# Patient Record
Sex: MALE | Race: WHITE
[De-identification: names, ages, dates, MRNs, and addresses within clinical notes are randomized per-mention and may not be internally consistent; named-entity substitution may affect disease eponyms.]

---

## 2018-02-19 ENCOUNTER — HOSPITAL ENCOUNTER (INPATIENT)
Dept: HOSPITAL 74 - JER | Age: 53
LOS: 4 days | Discharge: ANOTHER HOSPITAL | DRG: 292 | End: 2018-02-23
Attending: INTERNAL MEDICINE | Admitting: INTERNAL MEDICINE
Payer: MEDICARE

## 2018-02-19 VITALS — BODY MASS INDEX: 38.4 KG/M2

## 2018-02-19 DIAGNOSIS — I24.8: ICD-10-CM

## 2018-02-19 DIAGNOSIS — I11.0: Primary | ICD-10-CM

## 2018-02-19 DIAGNOSIS — I35.0: ICD-10-CM

## 2018-02-19 DIAGNOSIS — D69.6: ICD-10-CM

## 2018-02-19 DIAGNOSIS — E78.5: ICD-10-CM

## 2018-02-19 DIAGNOSIS — L03.115: ICD-10-CM

## 2018-02-19 DIAGNOSIS — D64.9: ICD-10-CM

## 2018-02-19 DIAGNOSIS — I50.21: ICD-10-CM

## 2018-02-19 LAB
ALBUMIN SERPL-MCNC: 3.1 G/DL (ref 3.4–5)
ALP SERPL-CCNC: 95 U/L (ref 45–117)
ALT SERPL-CCNC: 10 U/L (ref 12–78)
ANION GAP SERPL CALC-SCNC: 6 MMOL/L (ref 8–16)
AST SERPL-CCNC: 39 U/L (ref 15–37)
BASOPHILS # BLD: 1.1 % (ref 0–2)
BILIRUB SERPL-MCNC: 1.1 MG/DL (ref 0.2–1)
BUN SERPL-MCNC: 9 MG/DL (ref 7–18)
CALCIUM SERPL-MCNC: 8.6 MG/DL (ref 8.5–10.1)
CHLORIDE SERPL-SCNC: 99 MMOL/L (ref 98–107)
CO2 SERPL-SCNC: 29 MMOL/L (ref 21–32)
CREAT SERPL-MCNC: 0.8 MG/DL (ref 0.7–1.3)
DEPRECATED RDW RBC AUTO: 24.4 % (ref 11.9–15.9)
EOSINOPHIL # BLD: 1.1 % (ref 0–4.5)
GLUCOSE SERPL-MCNC: 90 MG/DL (ref 74–106)
HCT VFR BLD CALC: 28.2 % (ref 35.4–49)
HGB BLD-MCNC: 8.7 GM/DL (ref 11.7–16.9)
LYMPHOCYTES # BLD: 26.7 % (ref 8–40)
MCH RBC QN AUTO: 21.9 PG (ref 25.7–33.7)
MCHC RBC AUTO-ENTMCNC: 30.7 G/DL (ref 32–35.9)
MCV RBC: 71.4 FL (ref 80–96)
MONOCYTES # BLD AUTO: 15.3 % (ref 3.8–10.2)
NEUTROPHILS # BLD: 55.8 % (ref 42.8–82.8)
PLATELET # BLD AUTO: 101 K/MM3 (ref 134–434)
PMV BLD: 8.7 FL (ref 7.5–11.1)
POTASSIUM SERPLBLD-SCNC: 4.1 MMOL/L (ref 3.5–5.1)
PROT SERPL-MCNC: 9.6 G/DL (ref 6.4–8.2)
RBC # BLD AUTO: 3.95 M/MM3 (ref 4–5.6)
SODIUM SERPL-SCNC: 134 MMOL/L (ref 136–145)
WBC # BLD AUTO: 4.4 K/MM3 (ref 4–10)

## 2018-02-19 RX ADMIN — HEPARIN SODIUM SCH UNIT: 5000 INJECTION, SOLUTION INTRAVENOUS; SUBCUTANEOUS at 21:55

## 2018-02-19 NOTE — HP
Admitting History and Physical





- Primary Care Physician


PCP: Catalina Nino





- Admission


History of Present Illness: 


 52 year old male with a history of HTN, HLD, who presents for evaluation of 

SOB and lower extremity swelling.  The patient reports that he does not 

normally follow with a physician and does not know his medical history very 

well.  He reports a 2 week history of worsening lower extremity swelling.  He 

also states a 1 week history of worsening SOB and dyspnea on exertion with 

orthopnea prompting his presentation to the ED for evaluation.  The patient 

states that he has been having difficulty sleeping due to SOB at night as well 

and now can only walk 5 steps without becoming SOB. 


 











- Past Medical History


Cardiovascular: Yes: HTN, Hyperlipdemia





- Smoking History


Smoking history: Never smoked


Have you smoked in the past 12 months: No





- Alcohol/Substance Use


Hx Alcohol Use: No





Home Medications





- Allergies


Allergies/Adverse Reactions: 


 Allergies











Allergy/AdvReac Type Severity Reaction Status Date / Time


 


No Known Allergies Allergy   Verified 02/19/18 13:22














- Home Medications


Home Medications: 


Ambulatory Orders





Metoprolol Succinate 25 mg PO DAILY 02/19/18 











Physical Examination


Vital Signs: 


 Vital Signs











Temperature  98.6 F   02/19/18 13:23


 


Pulse Rate  107 H  02/19/18 16:17


 


Respiratory Rate  24   02/19/18 16:17


 


Blood Pressure  129/90   02/19/18 16:17


 


O2 Sat by Pulse Oximetry (%)  96   02/19/18 16:17











Constitutional: Yes: No Distress


HENT: Yes: Atraumatic


Neck: Yes: Supple


Cardiovascular: Yes: Regular Rate and Rhythm


Respiratory: Yes: CTA Bilaterally


Gastrointestinal: Yes: Normal Bowel Sounds


Edema: Yes


Edema: LLE: 2+, RLE: 2+


Neurological: Yes: Alert, Oriented


Labs: 


 CBC, BMP





 02/19/18 14:25 





 02/19/18 14:25 











Problem List





- Problems


(1) CHF (congestive heart failure)


Assessment/Plan: 


on iv lasix


monitor 


cardiology consult


Code(s): I50.9 - HEART FAILURE, UNSPECIFIED   


Qualifiers: 


   Heart failure type: systolic   Heart failure chronicity: acute   Qualified 

Code(s): I50.21 - Acute systolic (congestive) heart failure   





(2) Subendocardial ischemia


Assessment/Plan: 


fu troponins


Code(s): I24.8 - OTHER FORMS OF ACUTE ISCHEMIC HEART DISEASE   





(3) Thrombocytopenia


Assessment/Plan: 


monitor


Code(s): D69.6 - THROMBOCYTOPENIA, UNSPECIFIED   





(4) Cellulitis of right leg


Assessment/Plan: 


iv abx


id consult


Code(s): L03.115 - CELLULITIS OF RIGHT LOWER LIMB   





Assessment/Plan





 Laboratory Tests











  02/19/18 02/19/18





  14:25 14:25


 


WBC  4.4 


 


RBC  3.95 L 


 


Hgb  8.7 L 


 


Hct  28.2 L 


 


MCV  71.4 L 


 


MCH  21.9 L 


 


MCHC  30.7 L 


 


RDW  24.4 H 


 


Plt Count  101 L 


 


MPV  8.7 


 


Neutrophils %  55.8 


 


Lymphocytes %  26.7 


 


Monocytes %  15.3 H 


 


Eosinophils %  1.1 


 


Basophils %  1.1 


 


Sodium   134 L


 


Potassium   4.1


 


Chloride   99


 


Carbon Dioxide   29


 


Anion Gap   6 L


 


BUN   9


 


Creatinine   0.8


 


Creat Clearance w eGFR   > 60


 


Random Glucose   90


 


Calcium   8.6


 


Total Bilirubin   1.1 H


 


AST   39 H


 


ALT   10 L


 


Alkaline Phosphatase   95


 


Creatine Kinase   250


 


Creatine Kinase Index   2.9


 


CK-MB (CK-2)   7.376 H


 


Troponin I   0.15 H


 


B-Natriuretic Peptide   2294.20 H


 


Total Protein   9.6 H


 


Albumin   3.1 L








Active Medications











Generic Name Dose Route Start Last Admin





  Trade Name Freq  PRN Reason Stop Dose Admin


 


Furosemide  40 mg  02/20/18 14:00  02/21/18 14:18





  Lasix Injection -  IVPUSH   40 mg





  BIDLASIX DEREK   Administration


 


Heparin Sodium (Porcine)  5,000 unit  02/19/18 22:00  02/21/18 09:01





  Heparin -  SQ   Not Given





  BID DEREK   


 


Ampicillin Sodium/Sulbactam  100 mls @ 200 mls/hr  02/20/18 18:00  02/21/18 09:

01





  Sodium 3 gm/ Sodium Chloride  IVPB   200 mls/hr





  Q8H-IV DEREK   Administration


 


Losartan Potassium  25 mg  02/20/18 12:30  02/21/18 09:01





  Cozaar -  PO   25 mg





  DAILY DEREK   Administration


 


Metoprolol Succinate  25 mg  02/20/18 10:00  02/21/18 09:01





  Toprol Xl -  PO   25 mg





  DAILY DEREK   Administration


 


Spironolactone  25 mg  02/21/18 10:30  02/21/18 11:23





  Aldactone -  PO   25 mg





  DAILY DEREK   Administration

## 2018-02-19 NOTE — PDOC
Attending Attestation





- HPI


HPI: 





02/19/18 14:59


The patient is a 52 year old male with a significant PMH of HTN, HLD, and CHF 

who presents to the emergency department with lower extremity swelling for the 

past 2 weeks and SOB, dyspnea on exertion, and orthopnea for the past week. The 

patient states he does not follow up with a PCP. The patient is not currently 

on any water pills. 





The patient denies chest pain, headache and dizziness.


Denies fever, chills, nausea, vomit, diarrhea and constipation.


Denies dysuria, frequency, urgency and hematuria.





Allergies: NKA


Past surgical history: None reported


Social history: No reported alcohol, drug or cigarette use. 











<Rosie Vazquez - Last Filed: 02/19/18 14:59>





- Resident


Resident Name: Chris Pineda





- ED Attending Attestation


I have performed the following: I have examined & evaluated the patient, The 

case was reviewed & discussed with the resident, I agree w/resident's findings 

& plan, Exceptions are as noted





- Physicial Exam


PE: 





GENERAL: Awake, alert, and fully oriented, in no acute distress


HEAD: No signs of trauma


EYES: PERRLA, EOMI, sclera anicteric, conjunctiva clear


ENT: Auricles normal inspection, hearing grossly normal, nares patent, 

oropharynx clear without exudates. Moist mucosa


NECK: Normal ROM, supple, no lymphadenopathy, JVD, or masses


LUNGS: +Crackles at bases B/L. Good air entry B/L. 


HEART: Regular rate and rhythm, normal S1 and S2, no murmurs, rubs or gallops


ABDOMEN: Soft, nontender, normoactive bowel sounds.  No guarding, no rebound.  

No masses


EXTREMITIES: Normal range of motion, 3+ edema to the level of lower abdomen.  

No clubbing or cyanosis. No cords, erythema, or tenderness


NEUROLOGICAL: Cranial nerves II through XII grossly intact.  Normal speech, 

normal gait


SKIN: Warm, Dry, normal turgor, no rashes or lesions noted.











- Medical Decision Making





Pt with clear signs of CHF, likely due to med nonadherence. Will send labs, 

obtain CXR and plan for admission.








<Marilu Ruggiero - Last Filed: 02/20/18 11:17>

## 2018-02-19 NOTE — PDOC
History of Present Illness





- General


Chief Complaint: Shortness of Breath


Stated Complaint: SOB, SWOLLEN LEGS


Time Seen by Provider: 02/19/18 13:42





- History of Present Illness


Initial Comments: 





02/19/18 15:22


The patient is a 52 year old male with a history of HTN, HLD, who presents for 

evaluation of SOB and lower extremity swelling.  The patient reports that he 

does not normally follow with a physician and does not know his medical history 

very well.  He reports a 2 week history of worsening lower extremity swelling.  

He also states a 1 week history of worsening SOB and dyspnea on exertion with 

orthopnea prompting his presentation to the ED for evaluation.  The patient 

states that he has been having difficulty sleeping due to SOB at night as well 

and now can only walk 5 steps without becoming SOB.  He denies fevers, chills, 

chest pain, nausea, vomiting, abdominal pain, or changes with urination or 

bowel movements.





Past History





- Past Medical History


Allergies/Adverse Reactions: 


 Allergies











Allergy/AdvReac Type Severity Reaction Status Date / Time


 


No Known Allergies Allergy   Verified 02/19/18 13:22











Home Medications: 


Ambulatory Orders





Metoprolol Succinate 25 mg PO DAILY 02/19/18 











- Suicide/Smoking/Psychosocial Hx


Smoking History: Never smoked


Have you smoked in the past 12 months: No


Information on smoking cessation initiated: No


Hx Alcohol Use: No


Drug/Substance Use Hx: No





**Review of Systems





- Review of Systems


Comments:: 





02/19/18 15:32


Constitutional: No fevers, chills, fatigue, malaise


HEENT: No Rhinorrhea, nasal congestion, visual changes


Cardiovascular: No chest pain, syncope, palpitations, lightheadedness


Respiratory: SOB.  No Cough, Hemoptysis,


Gastrointestinal: No Abdominal pain, Nausea, Vomiting, Constipation, Diarrhea, 

Melena


Genitourinary: No Dysuria, Frequency, Urgency, Hesitancy, Hematuria, Flank pain


Musculoskeletal: No Myalgia, arthralgia


Skin: Lower extremity swelling.  No rashes, itching, bruising, pallor


Neurologic: No Headache, Dizziness, Numbness, Weakness, or Tingling


Psychiatric: No Hallucinations. No SI or HI








*Physical Exam





- Vital Signs


 Last Vital Signs











Temp Pulse Resp BP Pulse Ox


 


 98.6 F   105 H  26 H  141/104   98 


 


 02/19/18 13:23  02/19/18 13:23  02/19/18 13:23  02/19/18 13:23  02/19/18 13:23














- Physical Exam


Comments: 





02/19/18 15:33


General Appearance: Nourished. No Apparent Distress


HEENT: EOMI, RIGO. No Pharyngeal Erythema, Tonsillar Exudate, Tonsillar Erythema


Neck: No Cervical Lymphadenopathy


Respiratory/Chest: Diffuse crackles and rales auscultated on exam


Cardiovascular: Regular Rhythm, Regular Rate. Systolic murmur noted on exam.  

No Gallops, Rubs


Gastrointestinal/Abdominal: 3+ pitting abdominal edema noted.  Normal Bowel 

Sounds, Soft. No Guarding, Rebound, Tenderness


Musculoskeletal: No CVA Tenderness


Extremity: 3+ pitting edema in the lower extremities.  Normal Capillary Refill


Integumentary: Normal Color, Dry, Warm


Neurologic: Fully Oriented, Alert, Normal Mood/Affect, Normal Response,





**Heart Score/ECG Review


  ** #1


ECG reviewed & interpreted by me at: 15:36 (Sinus Tachycardic)


General ECG Interpretation: Sinus Rhythm, Normal Rate, Normal Intervals, No 

acute ischemic changes





ED Treatment Course





- LABORATORY


CBC & Chemistry Diagram: 


 02/19/18 14:25





 02/19/18 14:25





Medical Decision Making





- Medical Decision Making





02/19/18 15:36


The patient is a 52 year old male with a history of HTN, HLD, who presents for 

evaluation of SOB and lower extremity swelling.  Differential includes but is 

not limited to: CHF exacerbation, COPD, ACS, pneumonia, infectious, metabolic 

derangement.  Given the patient's physical exam, it is likely the patient 

symptoms are due to a CHF exacerbation.  We will obtain a cbc, cmp, troponin, 

bnp, ekg, chest plain film to evaluate further.  We will treat the patient with 

lasix here in the ED.  We will continue to monitor and reassess.





02/19/18 18:35


CBC is unremarkable.  cmp is unremarkable,  troponin is elevated to .15 and bnp 

is elevated to 2200.  Chest plain film demonstrates signs of congestive 

findings concerning for chf as read by our radiologist.  We believe the patient 

requires admission for further management of his symptoms.  We discussed the 

case with Dr. Nino who accepted the patient for admission.





*DC/Admit/Observation/Transfer


Diagnosis at time of Disposition: 


CHF (congestive heart failure)


Qualifiers:


 Heart failure type: unspecified Heart failure chronicity: acute Qualified Code(

s): I50.9 - Heart failure, unspecified








- Discharge Dispostion


Condition at time of disposition: Guarded


Admit: Yes





- Referrals





- Patient Instructions





- Post Discharge Activity

## 2018-02-20 LAB
ALBUMIN SERPL-MCNC: 2.8 G/DL (ref 3.4–5)
ALP SERPL-CCNC: 85 U/L (ref 45–117)
ALT SERPL-CCNC: 9 U/L (ref 12–78)
ANION GAP SERPL CALC-SCNC: 6 MMOL/L (ref 8–16)
ANISOCYTOSIS BLD QL: (no result)
AST SERPL-CCNC: 43 U/L (ref 15–37)
BASOPHILS # BLD: 0.9 % (ref 0–2)
BILIRUB SERPL-MCNC: 1.4 MG/DL (ref 0.2–1)
BUN SERPL-MCNC: 13 MG/DL (ref 7–18)
CALCIUM SERPL-MCNC: 9 MG/DL (ref 8.5–10.1)
CHLORIDE SERPL-SCNC: 96 MMOL/L (ref 98–107)
CO2 SERPL-SCNC: 31 MMOL/L (ref 21–32)
CREAT SERPL-MCNC: 0.9 MG/DL (ref 0.7–1.3)
DEPRECATED RDW RBC AUTO: 24.2 % (ref 11.9–15.9)
EOSINOPHIL # BLD: 1 % (ref 0–4.5)
GLUCOSE SERPL-MCNC: 107 MG/DL (ref 74–106)
HCT VFR BLD CALC: 25.5 % (ref 35.4–49)
HGB BLD-MCNC: 8.1 GM/DL (ref 11.7–16.9)
LYMPHOCYTES # BLD: 24.3 % (ref 8–40)
MCH RBC QN AUTO: 22.3 PG (ref 25.7–33.7)
MCHC RBC AUTO-ENTMCNC: 31.8 G/DL (ref 32–35.9)
MCV RBC: 70 FL (ref 80–96)
MONOCYTES # BLD AUTO: 19.8 % (ref 3.8–10.2)
NEUTROPHILS # BLD: 54 % (ref 42.8–82.8)
PLATELET # BLD AUTO: 98 K/MM3 (ref 134–434)
PMV BLD: 8.7 FL (ref 7.5–11.1)
POTASSIUM SERPLBLD-SCNC: 3.7 MMOL/L (ref 3.5–5.1)
PROT SERPL-MCNC: 9 G/DL (ref 6.4–8.2)
RBC # BLD AUTO: 3.64 M/MM3 (ref 4–5.6)
RBC MORPH BLD: YES
SODIUM SERPL-SCNC: 133 MMOL/L (ref 136–145)
WBC # BLD AUTO: 4.2 K/MM3 (ref 4–10)

## 2018-02-20 RX ADMIN — HEPARIN SODIUM SCH UNIT: 5000 INJECTION, SOLUTION INTRAVENOUS; SUBCUTANEOUS at 21:00

## 2018-02-20 RX ADMIN — HEPARIN SODIUM SCH UNIT: 5000 INJECTION, SOLUTION INTRAVENOUS; SUBCUTANEOUS at 10:39

## 2018-02-20 RX ADMIN — AMPICILLIN SODIUM AND SULBACTAM SODIUM SCH MLS/HR: 2; 1 INJECTION, POWDER, FOR SOLUTION INTRAMUSCULAR; INTRAVENOUS at 17:48

## 2018-02-20 RX ADMIN — FUROSEMIDE SCH MG: 10 INJECTION, SOLUTION INTRAVENOUS at 13:32

## 2018-02-20 RX ADMIN — LOSARTAN POTASSIUM SCH: 25 TABLET, FILM COATED ORAL at 13:32

## 2018-02-20 NOTE — CON.CARD
Consult


Consult Specialty:: Cardiology


Referred by:: Catalina Nino MD


Reason for Consultation:: CHF





- History of Present Illness


Chief Complaint: Dyspnea on exertion, orthopnea


History of Present Illness: 





52 year old male with a history of HTN, HLD, who presents for evaluation of SOB 

with exertion, orthopnea and lower extremity swelling for last several weeks 

with decreased exercise capacity, does not have medcation f/u, reports diet 

indiscretion and denies chest pain, near or true syncope, palpitations, PND, 

denies NSAID use.











- History Source


History Provided By: Patient


Limitations to Obtaining History: No Limitations





- Past Medical History


Cardio/Vascular: Yes: HTN, Hyperlipdemia





- Alcohol/Substance Use


Hx Alcohol Use: No





- Smoking History


Smoking history: Never smoked


Have you smoked in the past 12 months: No





Home Medications





- Allergies


Allergies/Adverse Reactions: 


 Allergies











Allergy/AdvReac Type Severity Reaction Status Date / Time


 


No Known Allergies Allergy   Verified 02/19/18 13:22














- Home Medications


Home Medications: 


Ambulatory Orders





Metoprolol Succinate 25 mg PO DAILY 02/19/18 











Review of Systems





- Review of Systems


Cardiovascular: reports: Edema, Shortness of Breath


Respiratory: reports: Orthopnea


Vital Signs: 


 Vital Signs











Temperature  98.5 F   02/20/18 05:59


 


Pulse Rate  104 H  02/20/18 05:59


 


Respiratory Rate  20   02/20/18 05:59


 


Blood Pressure  119/81   02/20/18 05:59


 


O2 Sat by Pulse Oximetry (%)  95   02/19/18 21:00











Constitutional: Yes: No Distress, Calm


Respiratory: Yes: Regular, Diminished


Gastrointestinal: Yes: Normal Bowel Sounds, Soft


Cardiovascular: Yes: Regular Rate and Rhythm


JVD: No


Carotid Bruit: No


Heart Sounds: Yes: S1, S2


Murmur: Yes: Systolic Murmur, Grade 1


Edema: Yes


Edema: LLE: 2+, RLE: 2+





- Other Data


Labs, Other Data: 


 CBC, BMP





 02/20/18 06:05 





 02/20/18 06:05 





 Troponin, BNP











  02/19/18





  14:25


 


Troponin I  0.15 H


 


B-Natriuretic Peptide  2294.20 H








 Troponin, BNP











  02/19/18





  14:25


 


Troponin I  0.15 H


 


B-Natriuretic Peptide  2294.20 H














NSR LAE





Imaging





- Results


Chest X-ray: Report Reviewed (CHF)





Problem List





- Problems


(1) Subendocardial ischemia


Code(s): I24.8 - OTHER FORMS OF ACUTE ISCHEMIC HEART DISEASE   





(2) Anemia


Code(s): D64.9 - ANEMIA, UNSPECIFIED   


Qualifiers: 


   Anemia type: unspecified type   Qualified Code(s): D64.9 - Anemia, 

unspecified   





(3) Thrombocytopenia


Code(s): D69.6 - THROMBOCYTOPENIA, UNSPECIFIED   





(4) CHF (congestive heart failure)


Code(s): I50.9 - HEART FAILURE, UNSPECIFIED   


Qualifiers: 


   Heart failure type: unspecified   Heart failure chronicity: acute   

Qualified Code(s): I50.9 - Heart failure, unspecified   





Assessment/Plan





1. Acute on chronic diastolic failure


2. Subendocardial ischemia


3. Anemia, thrombocytopenia





P:1. IV diuresis with monitor diuretic response, renal fxn, electrolytes


2. Trend trops to document peak


3. Continue Toprol XL 25 qd, start losartan 25 qd with uptitration as 

hemodynamics tolerate


4. Thank you for consultative opportunity

## 2018-02-20 NOTE — EKG
Test Reason : 

Blood Pressure : ***/*** mmHG

Vent. Rate : 105 BPM     Atrial Rate : 105 BPM

   P-R Int : 158 ms          QRS Dur : 100 ms

    QT Int : 364 ms       P-R-T Axes : 042 -01 077 degrees

   QTc Int : 481 ms

 

SINUS TACHYCARDIA

POSSIBLE LEFT ATRIAL ENLARGEMENT

BORDERLINE ECG

NO PREVIOUS ECGS AVAILABLE

Confirmed by Wayne Kingsley MD ([id number]) on 2/20/2018 8:56:53 AM

 

Referred By:             Confirmed By:Wayne Kingsley MD

## 2018-02-20 NOTE — CON.ID
Consult


Consult Specialty:: infectious diseases


Reason for Consultation:: cellulitis of the rt leg





- History of Present Illness


Chief Complaint: Dyspnea on exertion, orthopnea,swelling and redness of the legs


History of Present Illness: 





52 year old male with a history of HTN, HLD, who presents for evaluation of SOB 

with exertion, orthopnea and lower extremity swelling for last several weeks 

with decreased exercise capacity, 


patient does not follow up much


patient also noted that the right leg was more swollen and red


otherwise he feels ok.


patient mentions that he has been having this swelling off and on for quite 

some time


currently he is stable








- History Source


History Provided By: Patient


Limitations to Obtaining History: No Limitations





- Past Medical History


Cardio/Vascular: Yes: HTN, Hyperlipdemia





- Alcohol/Substance Use


Hx Alcohol Use: No





- Smoking History


Smoking history: Never smoked


Have you smoked in the past 12 months: No





Home Medications





- Allergies


Allergies/Adverse Reactions: 


 Allergies











Allergy/AdvReac Type Severity Reaction Status Date / Time


 


No Known Allergies Allergy   Verified 02/19/18 13:22














- Home Medications


Home Medications: 


Ambulatory Orders





Metoprolol Succinate 25 mg PO DAILY 02/19/18 











Review of Systems





- Review of Systems


Constitutional: reports: No Symptoms


Eyes: reports: No Symptoms


HENT: reports: No Symptoms


Neck: reports: No Symptoms


Cardiovascular: reports: Shortness of Breath


Respiratory: reports: SOB, SOB on Exertion


Gastrointestinal: reports: No Symptoms


Genitourinary: reports: No Symptoms


Musculoskeletal: reports: Other (edema of the legs)


Integumentary: reports: Change in Color, Erythema (rt leg)


Neurological: reports: No Symptoms


Endocrine: reports: No Symptoms


Hematology/Lymphatic: reports: No Symptoms


Psychiatric: reports: No Symptoms





Physical Exam


Vital Signs: 


 Vital Signs











Temperature  98.1 F   02/20/18 14:00


 


Pulse Rate  87   02/20/18 14:00


 


Respiratory Rate  20   02/20/18 13:31


 


Blood Pressure  100/74   02/20/18 14:00


 


O2 Sat by Pulse Oximetry (%)  95   02/20/18 09:00











Constitutional: Yes: Well Nourished, No Distress, Calm


Eyes: Yes: Conjunctiva Clear


HENT: Yes: Atraumatic


Cardiovascular: Yes: S1, S2, Other (murmur present)


Respiratory: Yes: Regular, CTA Bilaterally


Gastrointestinal: Yes: Normal Bowel Sounds, Soft


Musculoskeletal: Yes: Other


Extremities: Yes: Erythema (rt)


Edema: LLE: 2+, RLE: 2+


Integumentary: Yes: Erythema


Neurological: Yes: Alert, Oriented


Labs: 


 CBC, BMP





 02/20/18 06:05 





 02/20/18 06:05 











Imaging





- Results


Chest X-ray: Report Reviewed, Image Reviewed





Assessment/Plan





Problem List





- Problems


(1) cellulitis of the rt ext





(2) Anemia


Code(s): D64.9 - ANEMIA, UNSPECIFIED   


Qualifiers: 


   Anemia type: unspecified type   Qualified Code(s): D64.9 - Anemia, 

unspecified   





(3) Thrombocytopenia


Code(s): D69.6 - THROMBOCYTOPENIA, UNSPECIFIED   





(4) CHF (congestive heart failure)


Code(s): I50.9 - HEART FAILURE, UNSPECIFIED   


Qualifiers: 


   Heart failure type: unspecified   Heart failure chronicity: acute   

Qualified Code(s): I50.9 - Heart failure, unspecified  





 


plan


continue as per cardiology


will start patient on unasyn


rest as per primary


diuresis

## 2018-02-20 NOTE — PN
Progress Note, Physician


History of Present Illness: 





feeling good





- Current Medication List


Current Medications: 


Active Medications





Furosemide (Lasix Injection -)  40 mg IVPUSH BIDLASIX [facility]


   Last Admin: 02/20/18 13:32 Dose:  40 mg


Heparin Sodium (Porcine) (Heparin -)  5,000 unit SQ BID [facility]


   Last Admin: 02/20/18 10:39 Dose:  5,000 unit


Ampicillin Sodium/Sulbactam (Sodium 3 gm/ Sodium Chloride)  100 mls @ 200 mls/

hr IVPB Q8H-IV DEREK


Losartan Potassium (Cozaar -)  25 mg PO DAILY [facility]


   Last Admin: 02/20/18 13:32 Dose:  Not Given


Metoprolol Succinate (Toprol Xl -)  25 mg PO DAILY [facility]


   Last Admin: 02/20/18 10:39 Dose:  25 mg











- Objective


Vital Signs: 


 Vital Signs











Temperature  98.1 F   02/20/18 14:00


 


Pulse Rate  87   02/20/18 14:00


 


Respiratory Rate  20   02/20/18 13:31


 


Blood Pressure  100/74   02/20/18 14:00


 


O2 Sat by Pulse Oximetry (%)  95   02/20/18 09:00











Constitutional: Yes: No Distress


HENT: Yes: Atraumatic


Neck: Yes: Supple


Cardiovascular: Yes: Regular Rate and Rhythm


Respiratory: Yes: CTA Bilaterally


Gastrointestinal: Yes: Normal Bowel Sounds


Edema: Yes


Neurological: Yes: Alert, Oriented


Labs: 


 CBC, BMP





 02/20/18 06:05 





 02/20/18 06:05 











Problem List





- Problems


(1) CHF (congestive heart failure)


Assessment/Plan: 


on iv lasix


monitor 


cardiology consult


Code(s): I50.9 - HEART FAILURE, UNSPECIFIED   


Qualifiers: 


   Heart failure type: systolic   Heart failure chronicity: acute   Qualified 

Code(s): I50.21 - Acute systolic (congestive) heart failure   





(2) Subendocardial ischemia


Assessment/Plan: 


fu troponins


Code(s): I24.8 - OTHER FORMS OF ACUTE ISCHEMIC HEART DISEASE   





(3) Thrombocytopenia


Assessment/Plan: 


monitor


Code(s): D69.6 - THROMBOCYTOPENIA, UNSPECIFIED   





(4) Cellulitis of right leg


Assessment/Plan: 


iv abx


id consult


Code(s): L03.115 - CELLULITIS OF RIGHT LOWER LIMB

## 2018-02-21 RX ADMIN — FUROSEMIDE SCH MG: 10 INJECTION, SOLUTION INTRAVENOUS at 05:22

## 2018-02-21 RX ADMIN — SPIRONOLACTONE SCH MG: 25 TABLET, FILM COATED ORAL at 11:23

## 2018-02-21 RX ADMIN — FUROSEMIDE SCH MG: 10 INJECTION, SOLUTION INTRAVENOUS at 14:18

## 2018-02-21 RX ADMIN — AMPICILLIN SODIUM AND SULBACTAM SODIUM SCH MLS/HR: 2; 1 INJECTION, POWDER, FOR SOLUTION INTRAMUSCULAR; INTRAVENOUS at 17:43

## 2018-02-21 RX ADMIN — AMPICILLIN SODIUM AND SULBACTAM SODIUM SCH MLS/HR: 2; 1 INJECTION, POWDER, FOR SOLUTION INTRAMUSCULAR; INTRAVENOUS at 01:46

## 2018-02-21 RX ADMIN — AMPICILLIN SODIUM AND SULBACTAM SODIUM SCH MLS/HR: 2; 1 INJECTION, POWDER, FOR SOLUTION INTRAMUSCULAR; INTRAVENOUS at 09:01

## 2018-02-21 RX ADMIN — HEPARIN SODIUM SCH: 5000 INJECTION, SOLUTION INTRAVENOUS; SUBCUTANEOUS at 09:01

## 2018-02-21 RX ADMIN — HEPARIN SODIUM SCH UNIT: 5000 INJECTION, SOLUTION INTRAVENOUS; SUBCUTANEOUS at 21:18

## 2018-02-21 RX ADMIN — LOSARTAN POTASSIUM SCH MG: 25 TABLET, FILM COATED ORAL at 09:01

## 2018-02-21 NOTE — PN
Progress Note, Physician


History of Present Illness: 





doing well





- Current Medication List


Current Medications: 


Active Medications





Furosemide (Lasix Injection -)  40 mg IVPUSH BIDLASIX [facility]


   Last Admin: 02/21/18 14:18 Dose:  40 mg


Heparin Sodium (Porcine) (Heparin -)  5,000 unit SQ BID [facility]


   Last Admin: 02/21/18 09:01 Dose:  Not Given


Ampicillin Sodium/Sulbactam (Sodium 3 gm/ Sodium Chloride)  100 mls @ 200 mls/

hr IVPB Q8H-IV [facility]


   Last Admin: 02/21/18 09:01 Dose:  200 mls/hr


Losartan Potassium (Cozaar -)  25 mg PO DAILY [facility]


   Last Admin: 02/21/18 09:01 Dose:  25 mg


Metoprolol Succinate (Toprol Xl -)  25 mg PO DAILY [facility]


   Last Admin: 02/21/18 09:01 Dose:  25 mg


Spironolactone (Aldactone -)  25 mg PO DAILY [facility]


   Last Admin: 02/21/18 11:23 Dose:  25 mg











- Objective


Vital Signs: 


 Vital Signs











Temperature  99.1 F   02/21/18 13:25


 


Pulse Rate  82   02/21/18 13:25


 


Respiratory Rate  20   02/21/18 13:25


 


Blood Pressure  93/69   02/21/18 13:25


 


O2 Sat by Pulse Oximetry (%)  94 L  02/21/18 08:00











Constitutional: Yes: No Distress


HENT: Yes: Atraumatic


Neck: Yes: Supple


Cardiovascular: Yes: Regular Rate and Rhythm


Respiratory: Yes: CTA Bilaterally


Gastrointestinal: Yes: Normal Bowel Sounds


Extremities: Yes: Other (cellulitis R nik improving)


Edema: LLE: 2+, RLE: 2+


Peripheral Pulses WNL: Yes


Neurological: Yes: Alert, Oriented


Labs: 


 CBC, BMP





 02/20/18 06:05 





 02/20/18 06:05 











Problem List





- Problems


(1) CHF (congestive heart failure)


Assessment/Plan: 


on iv lasix


monitor 


cardiology consult


Code(s): I50.9 - HEART FAILURE, UNSPECIFIED   


Qualifiers: 


   Heart failure type: systolic   Heart failure chronicity: acute   Qualified 

Code(s): I50.21 - Acute systolic (congestive) heart failure   





(2) Subendocardial ischemia


Assessment/Plan: 


fu troponins


Code(s): I24.8 - OTHER FORMS OF ACUTE ISCHEMIC HEART DISEASE   





(3) Thrombocytopenia


Assessment/Plan: 


monitor


Code(s): D69.6 - THROMBOCYTOPENIA, UNSPECIFIED   





(4) Cellulitis of right leg


Assessment/Plan: 


iv abx


id consult


Code(s): L03.115 - CELLULITIS OF RIGHT LOWER LIMB   





(5) Aortic stenosis, severe


Assessment/Plan: 


for cardiac cath per cardiology


Code(s): I35.0 - NONRHEUMATIC AORTIC (VALVE) STENOSIS

## 2018-02-21 NOTE — PN
Progress Note, Physician


History of Present Illness: 


SOB with exertion, orthopnea and lower extremity swelling improving with 

diuresis.








- Current Medication List


Current Medications: 


Active Medications





Furosemide (Lasix Injection -)  40 mg IVPUSH BIDLASIX [facility]


   Last Admin: 02/21/18 05:22 Dose:  40 mg


Heparin Sodium (Porcine) (Heparin -)  5,000 unit SQ BID [facility]


   Last Admin: 02/21/18 09:01 Dose:  Not Given


Ampicillin Sodium/Sulbactam (Sodium 3 gm/ Sodium Chloride)  100 mls @ 200 mls/

hr IVPB Q8H-IV [facility]


   Last Admin: 02/21/18 09:01 Dose:  200 mls/hr


Losartan Potassium (Cozaar -)  25 mg PO DAILY [facility]


   Last Admin: 02/21/18 09:01 Dose:  25 mg


Metoprolol Succinate (Toprol Xl -)  25 mg PO DAILY [facility]


   Last Admin: 02/21/18 09:01 Dose:  25 mg











- Objective


Vital Signs: 


 Vital Signs











Temperature  98 F   02/21/18 07:50


 


Pulse Rate  88   02/21/18 07:50


 


Respiratory Rate  20   02/21/18 08:00


 


Blood Pressure  123/80   02/21/18 07:50


 


O2 Sat by Pulse Oximetry (%)  94 L  02/21/18 08:00











Constitutional: Yes: No Distress, Calm


Neck: Yes: Supple


Cardiovascular: Yes: Regular Rate and Rhythm, Murmur (2/6 SM)


Respiratory: Yes: Regular, Diminished


Gastrointestinal: Yes: Normal Bowel Sounds, Soft, Abdomen, Obese


Edema: Yes


Edema: LLE: 1+, RLE: 1+


Labs: 


 CBC, BMP





 02/20/18 06:05 





 02/20/18 06:05 











Problem List





- Problems


(1) Subendocardial ischemia


Code(s): I24.8 - OTHER FORMS OF ACUTE ISCHEMIC HEART DISEASE   





(2) Anemia


Code(s): D64.9 - ANEMIA, UNSPECIFIED   


Qualifiers: 


   Anemia type: unspecified type   Qualified Code(s): D64.9 - Anemia, 

unspecified   





(3) Thrombocytopenia


Code(s): D69.6 - THROMBOCYTOPENIA, UNSPECIFIED   





(4) CHF (congestive heart failure)


Code(s): I50.9 - HEART FAILURE, UNSPECIFIED   


Qualifiers: 


   Heart failure type: systolic   Heart failure chronicity: acute   Qualified 

Code(s): I50.21 - Acute systolic (congestive) heart failure   





(5) Aortic stenosis, severe


Code(s): I35.0 - NONRHEUMATIC AORTIC (VALVE) STENOSIS   





Assessment/Plan


02/20/18 Echo: Moderate dilated LV with severely decreased LV fxn, normal RV 

size and fxn, mod pulm HTN, mod-severe aortic stenosis BINU 0.66 cm^2, MG 34 mmHg





1. Acute on chronic systolic failure


2. Moderate to severe aortic valve stenosis


2. Subendocardial ischemia


3. Anemia, thrombocytopenia





P:1. IV diuresis with monitor diuretic response, renal fxn, electrolytes


2. Trops have peaked


3. Continue Toprol XL 25 qd, losartan 25 qd and add aldactone 25 qd with 

uptitration as hemodynamics tolerate


4. Eventually R&LHc for evaluation of cardiomyopathy, severity of aortic 

stenosis (transvalvular aortic valve gradient) once euvolemic

## 2018-02-21 NOTE — EKG
Test Reason : 

Blood Pressure : ***/*** mmHG

Vent. Rate : 083 BPM     Atrial Rate : 083 BPM

   P-R Int : 162 ms          QRS Dur : 104 ms

    QT Int : 438 ms       P-R-T Axes : 000 176 110 degrees

   QTc Int : 514 ms

 

NORMAL SINUS RHYTHM

RIGHT AXIS DEVIATION

PROLONGED QT

ABNORMAL ECG

WHEN COMPARED WITH ECG OF 19-FEB-2018 14:24,

QRS AXIS SHIFTED RIGHT

Confirmed by MAGALIS BAEZ MD ([id number]) on 2/21/2018 5:06:50 PM

 

Referred By:  DANELLE HOOPER           Confirmed By:MAGALIS BAEZ MD

## 2018-02-22 LAB
ANION GAP SERPL CALC-SCNC: 10 MMOL/L (ref 8–16)
BUN SERPL-MCNC: 21 MG/DL (ref 7–18)
CALCIUM SERPL-MCNC: 8.4 MG/DL (ref 8.5–10.1)
CHLORIDE SERPL-SCNC: 97 MMOL/L (ref 98–107)
CO2 SERPL-SCNC: 28 MMOL/L (ref 21–32)
CREAT SERPL-MCNC: 1.1 MG/DL (ref 0.7–1.3)
DEPRECATED RDW RBC AUTO: 24 % (ref 11.9–15.9)
GLUCOSE SERPL-MCNC: 131 MG/DL (ref 74–106)
HCT VFR BLD CALC: 26.3 % (ref 35.4–49)
HGB BLD-MCNC: 7.9 GM/DL (ref 11.7–16.9)
INR BLD: 1.75 (ref 0.82–1.09)
MCH RBC QN AUTO: 21.5 PG (ref 25.7–33.7)
MCHC RBC AUTO-ENTMCNC: 30.1 G/DL (ref 32–35.9)
MCV RBC: 71.4 FL (ref 80–96)
PLATELET # BLD AUTO: 90 K/MM3 (ref 134–434)
PMV BLD: 9 FL (ref 7.5–11.1)
POTASSIUM SERPLBLD-SCNC: 3.5 MMOL/L (ref 3.5–5.1)
PT PNL PPP: 19.8 SEC (ref 9.98–11.88)
RBC # BLD AUTO: 3.68 M/MM3 (ref 4–5.6)
SODIUM SERPL-SCNC: 135 MMOL/L (ref 136–145)
WBC # BLD AUTO: 4.8 K/MM3 (ref 4–10)

## 2018-02-22 RX ADMIN — HEPARIN SODIUM SCH UNIT: 5000 INJECTION, SOLUTION INTRAVENOUS; SUBCUTANEOUS at 09:32

## 2018-02-22 RX ADMIN — AMPICILLIN SODIUM AND SULBACTAM SODIUM SCH MLS/HR: 2; 1 INJECTION, POWDER, FOR SOLUTION INTRAMUSCULAR; INTRAVENOUS at 01:39

## 2018-02-22 RX ADMIN — LOSARTAN POTASSIUM SCH MG: 25 TABLET, FILM COATED ORAL at 09:32

## 2018-02-22 RX ADMIN — SPIRONOLACTONE SCH MG: 25 TABLET, FILM COATED ORAL at 09:32

## 2018-02-22 RX ADMIN — FUROSEMIDE SCH MG: 10 INJECTION, SOLUTION INTRAVENOUS at 06:40

## 2018-02-22 RX ADMIN — HEPARIN SODIUM SCH UNIT: 5000 INJECTION, SOLUTION INTRAVENOUS; SUBCUTANEOUS at 22:01

## 2018-02-22 RX ADMIN — AMPICILLIN SODIUM AND SULBACTAM SODIUM SCH MLS/HR: 2; 1 INJECTION, POWDER, FOR SOLUTION INTRAMUSCULAR; INTRAVENOUS at 09:31

## 2018-02-22 RX ADMIN — AMPICILLIN SODIUM AND SULBACTAM SODIUM SCH MLS/HR: 2; 1 INJECTION, POWDER, FOR SOLUTION INTRAMUSCULAR; INTRAVENOUS at 17:33

## 2018-02-22 NOTE — PN
Progress Note, Physician


History of Present Illness: 


SOB with exertion, orthopnea and lower extremity swelling improving with 

diuresis.








- Current Medication List


Current Medications: 


Active Medications





Furosemide (Lasix Injection -)  40 mg IVPUSH BIDLASIX [facility]


   Last Admin: 02/22/18 06:40 Dose:  40 mg


Heparin Sodium (Porcine) (Heparin -)  5,000 unit SQ BID [facility]


   Last Admin: 02/22/18 09:32 Dose:  5,000 unit


Ampicillin Sodium/Sulbactam (Sodium 3 gm/ Sodium Chloride)  100 mls @ 200 mls/

hr IVPB Q8H-IV [facility]


   Last Admin: 02/22/18 09:31 Dose:  200 mls/hr


Losartan Potassium (Cozaar -)  25 mg PO DAILY [facility]


   Last Admin: 02/22/18 09:32 Dose:  25 mg


Metoprolol Succinate (Toprol Xl -)  25 mg PO DAILY [facility]


   Last Admin: 02/22/18 09:32 Dose:  25 mg


Spironolactone (Aldactone -)  25 mg PO DAILY [facility]


   Last Admin: 02/22/18 09:32 Dose:  25 mg











- Objective


Vital Signs: 


 Vital Signs











Temperature  98.4 F   02/22/18 06:00


 


Pulse Rate  100 H  02/22/18 06:00


 


Respiratory Rate  20   02/22/18 06:00


 


Blood Pressure  102/70   02/22/18 06:00


 


O2 Sat by Pulse Oximetry (%)  97   02/21/18 20:31











Constitutional: Yes: No Distress, Calm


Neck: Yes: Supple


Cardiovascular: Yes: Regular Rate and Rhythm, Murmur (2/6)


Respiratory: Yes: Regular, Diminished


Gastrointestinal: Yes: Normal Bowel Sounds, Soft


Edema: Yes


Edema: LLE: 2+, RLE: 2+


Labs: 


 CBC, BMP





 02/22/18 05:05 





 02/22/18 05:05 





 INR, PTT











INR  1.75  (0.82-1.09)  H  02/22/18  05:05    














- ....Imaging


EKG: Report Reviewed (NSR)





Problem List





- Problems


(1) Subendocardial ischemia


Code(s): I24.8 - OTHER FORMS OF ACUTE ISCHEMIC HEART DISEASE   





(2) Anemia


Code(s): D64.9 - ANEMIA, UNSPECIFIED   


Qualifiers: 


   Anemia type: unspecified type   Qualified Code(s): D64.9 - Anemia, 

unspecified   





(3) Thrombocytopenia


Code(s): D69.6 - THROMBOCYTOPENIA, UNSPECIFIED   





(4) CHF (congestive heart failure)


Code(s): I50.9 - HEART FAILURE, UNSPECIFIED   


Qualifiers: 


   Heart failure type: systolic   Heart failure chronicity: acute   Qualified 

Code(s): I50.21 - Acute systolic (congestive) heart failure   





(5) Aortic stenosis, severe


Code(s): I35.0 - NONRHEUMATIC AORTIC (VALVE) STENOSIS   





Assessment/Plan


02/20/18 Echo: Moderate dilated LV with severely decreased LV fxn, normal RV 

size and fxn, mod pulm HTN, mod-severe aortic stenosis BINU 0.66 cm^2, MG 34 mmHg





1. Acute on chronic systolic failure improving


2. Moderate to severe aortic valve stenosis


2. Subendocardial ischemia


3. Anemia, thrombocytopenia





P:1. Decrease diuresis with monitor diuretic response, renal fxn, electrolytes


2. Trops have peaked


3. Increase Toprol XL 50 qd, losartan 25 qd and aldactone 25 qd with 

uptitration as hemodynamics tolerate


4. R&LHc for evaluation of cardiomyopathy, severity of aortic stenosis (

transvalvular aortic valve gradient) tomorrow at [facility]

## 2018-02-22 NOTE — PN
Progress Note, Physician


History of Present Illness: 





patient stable


no new issues





- Current Medication List


Current Medications: 


Active Medications





Furosemide (Lasix Injection -)  40 mg IVPUSH DAILY [facility]


Heparin Sodium (Porcine) (Heparin -)  5,000 unit SQ BID [facility]


   Last Admin: 02/22/18 09:32 Dose:  5,000 unit


Ampicillin Sodium/Sulbactam (Sodium 3 gm/ Sodium Chloride)  100 mls @ 200 mls/

hr IVPB Q8H-IV [facility]


   Last Admin: 02/22/18 09:31 Dose:  200 mls/hr


Losartan Potassium (Cozaar -)  25 mg PO DAILY [facility]


   Last Admin: 02/22/18 09:32 Dose:  25 mg


Metoprolol Succinate (Toprol Xl -)  25 mg PO ONCE ONE


   Stop: 02/22/18 11:16


Metoprolol Succinate (Toprol Xl -)  50 mg PO DAILY [facility]


Spironolactone (Aldactone -)  25 mg PO DAILY [facility]


   Last Admin: 02/22/18 09:32 Dose:  25 mg











- Objective


Vital Signs: 


 Vital Signs











Temperature  99.0 F   02/22/18 10:00


 


Pulse Rate  98 H  02/22/18 10:00


 


Respiratory Rate  20   02/22/18 10:00


 


Blood Pressure  123/68   02/22/18 10:00


 


O2 Sat by Pulse Oximetry (%)  97   02/22/18 09:00











Constitutional: Yes: No Distress, Calm


Cardiovascular: Yes: Regular Rate and Rhythm


Respiratory: Yes: Regular, CTA Bilaterally


Gastrointestinal: Yes: Normal Bowel Sounds, Soft


Musculoskeletal: Yes: Other


Extremities: Yes: Erythema, Other


Edema: LLE: Trace, RLE: 2+


Neurological: Yes: Alert, Oriented


Psychiatric: Yes: Alert, Oriented


Labs: 


 CBC, BMP





 02/22/18 05:05 





 02/22/18 05:05 





 INR, PTT











INR  1.75  (0.82-1.09)  H  02/22/18  05:05    














Assessment/Plan





Problem List





- Problems


(1) cellulitis of the rt ext





(2) Anemia


Code(s): D64.9 - ANEMIA, UNSPECIFIED   


Qualifiers: 


   Anemia type: unspecified type   Qualified Code(s): D64.9 - Anemia, 

unspecified   





(3) Thrombocytopenia


Code(s): D69.6 - THROMBOCYTOPENIA, UNSPECIFIED   





(4) CHF (congestive heart failure)


Code(s): I50.9 - HEART FAILURE, UNSPECIFIED   


Qualifiers: 


   Heart failure type: unspecified   Heart failure chronicity: acute   

Qualified Code(s): I50.9 - Heart failure, unspecified  





 


plan


continue unasyn


leg improving

## 2018-02-22 NOTE — PN
Progress Note, Physician


History of Present Illness: 





leg looks much better


still with c/o of pain in the leg


swelling improving


patient for cardiac cath probably tomorrow





- Current Medication List


Current Medications: 


Active Medications





Furosemide (Lasix Injection -)  40 mg IVPUSH DAILY [facility]


Heparin Sodium (Porcine) (Heparin -)  5,000 unit SQ BID [facility]


   Last Admin: 02/22/18 09:32 Dose:  5,000 unit


Ampicillin Sodium/Sulbactam (Sodium 3 gm/ Sodium Chloride)  100 mls @ 200 mls/

hr IVPB Q8H-IV [facility]


   Last Admin: 02/22/18 09:31 Dose:  200 mls/hr


Losartan Potassium (Cozaar -)  25 mg PO DAILY [facility]


   Last Admin: 02/22/18 09:32 Dose:  25 mg


Metoprolol Succinate (Toprol Xl -)  25 mg PO ONCE ONE


   Stop: 02/22/18 11:16


Metoprolol Succinate (Toprol Xl -)  50 mg PO DAILY [facility]


Spironolactone (Aldactone -)  25 mg PO DAILY [facility]


   Last Admin: 02/22/18 09:32 Dose:  25 mg











- Objective


Vital Signs: 


 Vital Signs











Temperature  99.0 F   02/22/18 10:00


 


Pulse Rate  98 H  02/22/18 10:00


 


Respiratory Rate  20   02/22/18 10:00


 


Blood Pressure  123/68   02/22/18 10:00


 


O2 Sat by Pulse Oximetry (%)  97   02/22/18 09:00











Constitutional: Yes: Calm, Mild Distress


Eyes: Yes: Conjunctiva Clear


Cardiovascular: Yes: Regular Rate and Rhythm


Respiratory: Yes: Regular, CTA Bilaterally


Gastrointestinal: Yes: Normal Bowel Sounds, Soft


Musculoskeletal: Yes: Other


Extremities: Yes: Erythema (improving of rt leg)


Edema: LLE: Trace, RLE: 2+


Integumentary: Yes: Erythema (resolving)


Neurological: Yes: Alert, Oriented


Psychiatric: Yes: Alert, Oriented


Labs: 


 CBC, BMP





 02/22/18 05:05 





 02/22/18 05:05 





 INR, PTT











INR  1.75  (0.82-1.09)  H  02/22/18  05:05    














Assessment/Plan





Problem List





- Problems


(1) cellulitis of the rt ext





(2) Anemia


Code(s): D64.9 - ANEMIA, UNSPECIFIED   


Qualifiers: 


   Anemia type: unspecified type   Qualified Code(s): D64.9 - Anemia, 

unspecified   





(3) Thrombocytopenia


Code(s): D69.6 - THROMBOCYTOPENIA, UNSPECIFIED   





(4) CHF (congestive heart failure)


Code(s): I50.9 - HEART FAILURE, UNSPECIFIED   


Qualifiers: 


   Heart failure type: unspecified   Heart failure chronicity: acute   

Qualified Code(s): I50.9 - Heart failure, unspecified  





 


plan


continue unasyn


leg improving


 will be switched to oral augmentin tomorrow


rest as per primary team

## 2018-02-22 NOTE — PN
Progress Note, Physician


History of Present Illness: 





doing well





- Current Medication List


Current Medications: 


Active Medications





Furosemide (Lasix Injection -)  40 mg IVPUSH DAILY [facility]


Heparin Sodium (Porcine) (Heparin -)  5,000 unit SQ BID [facility]


   Last Admin: 02/22/18 09:32 Dose:  5,000 unit


Ampicillin Sodium/Sulbactam (Sodium 3 gm/ Sodium Chloride)  100 mls @ 200 mls/

hr IVPB Q8H-IV [facility]


   Last Admin: 02/22/18 09:31 Dose:  200 mls/hr


Losartan Potassium (Cozaar -)  25 mg PO DAILY [facility]


   Last Admin: 02/22/18 09:32 Dose:  25 mg


Metoprolol Succinate (Toprol Xl -)  50 mg PO DAILY [facility]


Spironolactone (Aldactone -)  25 mg PO DAILY [facility]


   Last Admin: 02/22/18 09:32 Dose:  25 mg











- Objective


Vital Signs: 


 Vital Signs











Temperature  97.9 F   02/22/18 14:51


 


Pulse Rate  89   02/22/18 14:51


 


Respiratory Rate  20   02/22/18 14:51


 


Blood Pressure  105/89   02/22/18 14:51


 


O2 Sat by Pulse Oximetry (%)  97   02/22/18 09:00











HENT: Yes: Atraumatic


Neck: Yes: Supple


Cardiovascular: Yes: Regular Rate and Rhythm


Respiratory: Yes: CTA Bilaterally


Gastrointestinal: Yes: Normal Bowel Sounds


Extremities: Yes: Other (cellulitis rlex)


Edema: Yes


Edema: LLE: 1+, RLE: 1+


Neurological: Yes: Alert, Oriented


Labs: 


 CBC, BMP





 02/22/18 05:05 





 02/22/18 05:05 





 INR, PTT











INR  1.75  (0.82-1.09)  H  02/22/18  05:05    














Problem List





- Problems


(1) CHF (congestive heart failure)


Assessment/Plan: 


on iv lasix


monitor 


cardiology consult


Code(s): I50.9 - HEART FAILURE, UNSPECIFIED   


Qualifiers: 


   Heart failure type: systolic   Heart failure chronicity: acute   Qualified 

Code(s): I50.21 - Acute systolic (congestive) heart failure   





(2) Subendocardial ischemia


Assessment/Plan: 


fu troponins


Code(s): I24.8 - OTHER FORMS OF ACUTE ISCHEMIC HEART DISEASE   





(3) Thrombocytopenia


Assessment/Plan: 


monitor


Code(s): D69.6 - THROMBOCYTOPENIA, UNSPECIFIED   





(4) Cellulitis of right leg


Assessment/Plan: 


iv abx


id consult


Code(s): L03.115 - CELLULITIS OF RIGHT LOWER LIMB   





(5) Aortic stenosis, severe


Assessment/Plan: 


for cardiac cath per cardiology


Code(s): I35.0 - NONRHEUMATIC AORTIC (VALVE) STENOSIS

## 2018-02-23 VITALS — TEMPERATURE: 98.5 F | HEART RATE: 89 BPM | DIASTOLIC BLOOD PRESSURE: 67 MMHG | SYSTOLIC BLOOD PRESSURE: 109 MMHG

## 2018-02-23 LAB
ANION GAP SERPL CALC-SCNC: 10 MMOL/L (ref 8–16)
BUN SERPL-MCNC: 20 MG/DL (ref 7–18)
CALCIUM SERPL-MCNC: 8.4 MG/DL (ref 8.5–10.1)
CHLORIDE SERPL-SCNC: 96 MMOL/L (ref 98–107)
CO2 SERPL-SCNC: 27 MMOL/L (ref 21–32)
CREAT SERPL-MCNC: 1 MG/DL (ref 0.7–1.3)
DEPRECATED RDW RBC AUTO: 24.2 % (ref 11.9–15.9)
GLUCOSE SERPL-MCNC: 108 MG/DL (ref 74–106)
HCT VFR BLD CALC: 25.9 % (ref 35.4–49)
HGB BLD-MCNC: 7.8 GM/DL (ref 11.7–16.9)
MCH RBC QN AUTO: 21.8 PG (ref 25.7–33.7)
MCHC RBC AUTO-ENTMCNC: 30.1 G/DL (ref 32–35.9)
MCV RBC: 72.5 FL (ref 80–96)
PLATELET # BLD AUTO: 89 K/MM3 (ref 134–434)
PMV BLD: 8.5 FL (ref 7.5–11.1)
POTASSIUM SERPLBLD-SCNC: 3.6 MMOL/L (ref 3.5–5.1)
RBC # BLD AUTO: 3.57 M/MM3 (ref 4–5.6)
SODIUM SERPL-SCNC: 133 MMOL/L (ref 136–145)
WBC # BLD AUTO: 6 K/MM3 (ref 4–10)

## 2018-02-23 RX ADMIN — HEPARIN SODIUM SCH: 5000 INJECTION, SOLUTION INTRAVENOUS; SUBCUTANEOUS at 09:19

## 2018-02-23 RX ADMIN — SPIRONOLACTONE SCH MG: 25 TABLET, FILM COATED ORAL at 09:19

## 2018-02-23 RX ADMIN — LOSARTAN POTASSIUM SCH MG: 25 TABLET, FILM COATED ORAL at 09:19

## 2018-02-23 RX ADMIN — AMPICILLIN SODIUM AND SULBACTAM SODIUM SCH MLS/HR: 2; 1 INJECTION, POWDER, FOR SOLUTION INTRAMUSCULAR; INTRAVENOUS at 09:20

## 2018-02-23 RX ADMIN — AMPICILLIN SODIUM AND SULBACTAM SODIUM SCH MLS/HR: 2; 1 INJECTION, POWDER, FOR SOLUTION INTRAMUSCULAR; INTRAVENOUS at 02:35

## 2018-02-23 NOTE — PN
Progress Note, Physician


History of Present Illness: 


SOB with exertion, orthopnea and lower extremity swelling improving with 

diuresis.








- Current Medication List


Current Medications: 


Active Medications





Furosemide (Lasix Injection -)  40 mg IVPUSH DAILY [facility]


   Last Admin: 02/23/18 09:19 Dose:  40 mg


Heparin Sodium (Porcine) (Heparin -)  5,000 unit SQ BID [facility]


   Last Admin: 02/23/18 09:19 Dose:  Not Given


Ampicillin Sodium/Sulbactam (Sodium 3 gm/ Sodium Chloride)  100 mls @ 200 mls/

hr IVPB Q8H-IV [facility]


   Last Admin: 02/23/18 09:20 Dose:  200 mls/hr


Losartan Potassium (Cozaar -)  25 mg PO DAILY [facility]


   Last Admin: 02/23/18 09:19 Dose:  25 mg


Metoprolol Succinate (Toprol Xl -)  50 mg PO DAILY [facility]


   Last Admin: 02/23/18 09:19 Dose:  50 mg


Spironolactone (Aldactone -)  25 mg PO DAILY [facility]


   Last Admin: 02/23/18 09:19 Dose:  25 mg











- Objective


Vital Signs: 


 Vital Signs











Temperature  98.5 F   02/23/18 10:00


 


Pulse Rate  89   02/23/18 10:00


 


Respiratory Rate  18   02/23/18 10:00


 


Blood Pressure  109/67   02/23/18 10:00


 


O2 Sat by Pulse Oximetry (%)  98   02/23/18 09:00











Constitutional: Yes: No Distress, Calm


Neck: Yes: Supple


Cardiovascular: Yes: Regular Rate and Rhythm, Murmur (2/6 SM)


Respiratory: Yes: Regular, Diminished


Gastrointestinal: Yes: Normal Bowel Sounds, Soft


Edema: No


Labs: 


 CBC, BMP





 02/23/18 06:30 





 02/23/18 06:30 





 INR, PTT











INR  1.75  (0.82-1.09)  H  02/22/18  05:05    














Problem List





- Problems


(1) Subendocardial ischemia


Code(s): I24.8 - OTHER FORMS OF ACUTE ISCHEMIC HEART DISEASE   





(2) Anemia


Code(s): D64.9 - ANEMIA, UNSPECIFIED   


Qualifiers: 


   Anemia type: unspecified type   Qualified Code(s): D64.9 - Anemia, 

unspecified   





(3) Thrombocytopenia


Code(s): D69.6 - THROMBOCYTOPENIA, UNSPECIFIED   





(4) CHF (congestive heart failure)


Code(s): I50.9 - HEART FAILURE, UNSPECIFIED   


Qualifiers: 


   Heart failure type: systolic   Heart failure chronicity: acute   Qualified 

Code(s): I50.21 - Acute systolic (congestive) heart failure   





(5) Aortic stenosis, severe


Code(s): I35.0 - NONRHEUMATIC AORTIC (VALVE) STENOSIS   





Assessment/Plan


02/20/18 Echo: Moderate dilated LV with severely decreased LV fxn, normal RV 

size and fxn, mod pulm HTN, mod-severe aortic stenosis BINU 0.66 cm^2, MG 34 mmHg





1. Acute on chronic systolic failure improving


2. Moderate to severe aortic valve stenosis


2. Subendocardial ischemia


3. Anemia, thrombocytopenia


4. RLE cellulitis improving





P:1. Decrease diuresis with monitor diuretic response, renal fxn, electrolytes


2. Trops have peaked


3. Toprol XL 50 qd, losartan 25 qd and aldactone 25 qd with uptitration as 

hemodynamics tolerate


4. R&LHc for evaluation of cardiomyopathy, severity of aortic stenosis (

transvalvular aortic valve gradient) tomorrow at [facility]


5. Complete abx course per ID

## 2018-02-23 NOTE — PN
Progress Note, Physician


History of Present Illness: 





leg looks much better


pain better





- Current Medication List


Current Medications: 


Active Medications





Furosemide (Lasix Injection -)  40 mg IVPUSH DAILY [facility]


   Last Admin: 02/23/18 09:19 Dose:  40 mg


Heparin Sodium (Porcine) (Heparin -)  5,000 unit SQ BID [facility]


   Last Admin: 02/23/18 09:19 Dose:  Not Given


Ampicillin Sodium/Sulbactam (Sodium 3 gm/ Sodium Chloride)  100 mls @ 200 mls/

hr IVPB Q8H-IV [facility]


   Last Admin: 02/23/18 09:20 Dose:  200 mls/hr


Losartan Potassium (Cozaar -)  25 mg PO DAILY [facility]


   Last Admin: 02/23/18 09:19 Dose:  25 mg


Metoprolol Succinate (Toprol Xl -)  50 mg PO DAILY [facility]


   Last Admin: 02/23/18 09:19 Dose:  50 mg


Spironolactone (Aldactone -)  25 mg PO DAILY [facility]


   Last Admin: 02/23/18 09:19 Dose:  25 mg











- Objective


Vital Signs: 


 Vital Signs











Temperature  98.5 F   02/23/18 10:00


 


Pulse Rate  89   02/23/18 10:00


 


Respiratory Rate  18   02/23/18 10:00


 


Blood Pressure  109/67   02/23/18 10:00


 


O2 Sat by Pulse Oximetry (%)  98   02/23/18 09:00











Constitutional: Yes: No Distress, Calm


Neck: Yes: Supple, Trachea Midline


Cardiovascular: Yes: S1, S2, Other


Respiratory: Yes: Regular, CTA Bilaterally


Gastrointestinal: Yes: Normal Bowel Sounds, Soft


Musculoskeletal: Yes: WNL


Extremities: Yes: Other


Neurological: Yes: Alert, Oriented


Psychiatric: Yes: Alert, Oriented


Labs: 


 CBC, BMP





 02/23/18 06:30 





 02/23/18 06:30 





 INR, PTT











INR  1.75  (0.82-1.09)  H  02/22/18  05:05    














Assessment/Plan





Problem List





- Problems


(1) cellulitis of the rt ext





(2) Anemia


Code(s): D64.9 - ANEMIA, UNSPECIFIED   


Qualifiers: 


   Anemia type: unspecified type   Qualified Code(s): D64.9 - Anemia, 

unspecified   





(3) Thrombocytopenia


Code(s): D69.6 - THROMBOCYTOPENIA, UNSPECIFIED   





(4) CHF (congestive heart failure)


Code(s): I50.9 - HEART FAILURE, UNSPECIFIED   


Qualifiers: 


   Heart failure type: unspecified   Heart failure chronicity: acute   

Qualified Code(s): I50.9 - Heart failure, unspecified  





 


plan


switch to oral augmentin


patinet for cardiac cath


rest as per cardio


and primary

## 2019-10-17 ENCOUNTER — HOSPITAL ENCOUNTER (INPATIENT)
Dept: HOSPITAL 74 - JER | Age: 54
LOS: 5 days | Discharge: HOME | DRG: 433 | End: 2019-10-22
Attending: GENERAL ACUTE CARE HOSPITAL | Admitting: INTERNAL MEDICINE
Payer: COMMERCIAL

## 2019-10-17 VITALS — BODY MASS INDEX: 30.6 KG/M2

## 2019-10-17 DIAGNOSIS — I50.22: ICD-10-CM

## 2019-10-17 DIAGNOSIS — R94.5: ICD-10-CM

## 2019-10-17 DIAGNOSIS — Z95.0: ICD-10-CM

## 2019-10-17 DIAGNOSIS — I24.8: ICD-10-CM

## 2019-10-17 DIAGNOSIS — K70.30: Primary | ICD-10-CM

## 2019-10-17 DIAGNOSIS — R26.81: ICD-10-CM

## 2019-10-17 DIAGNOSIS — I43: ICD-10-CM

## 2019-10-17 DIAGNOSIS — R16.1: ICD-10-CM

## 2019-10-17 DIAGNOSIS — I11.0: ICD-10-CM

## 2019-10-17 DIAGNOSIS — I97.89: ICD-10-CM

## 2019-10-17 DIAGNOSIS — Z95.3: ICD-10-CM

## 2019-10-17 DIAGNOSIS — E80.6: ICD-10-CM

## 2019-10-17 DIAGNOSIS — I31.3: ICD-10-CM

## 2019-10-17 DIAGNOSIS — M62.82: ICD-10-CM

## 2019-10-17 DIAGNOSIS — K72.90: ICD-10-CM

## 2019-10-17 DIAGNOSIS — R41.0: ICD-10-CM

## 2019-10-17 DIAGNOSIS — D69.6: ICD-10-CM

## 2019-10-17 DIAGNOSIS — D68.9: ICD-10-CM

## 2019-10-17 DIAGNOSIS — E78.5: ICD-10-CM

## 2019-10-17 DIAGNOSIS — I35.0: ICD-10-CM

## 2019-10-17 DIAGNOSIS — K76.6: ICD-10-CM

## 2019-10-17 DIAGNOSIS — R80.9: ICD-10-CM

## 2019-10-17 DIAGNOSIS — F10.231: ICD-10-CM

## 2019-10-17 DIAGNOSIS — R44.1: ICD-10-CM

## 2019-10-17 DIAGNOSIS — E51.2: ICD-10-CM

## 2019-10-17 DIAGNOSIS — K70.10: ICD-10-CM

## 2019-10-17 LAB
ALBUMIN SERPL-MCNC: 3.4 G/DL (ref 3.4–5)
ALP SERPL-CCNC: 98 U/L (ref 45–117)
ALT SERPL-CCNC: 40 U/L (ref 13–61)
AMPHET UR-MCNC: NEGATIVE NG/ML
ANION GAP SERPL CALC-SCNC: 9 MMOL/L (ref 8–16)
APPEARANCE UR: CLEAR
AST SERPL-CCNC: 147 U/L (ref 15–37)
BACTERIA # UR AUTO: 1.7 /HPF
BARBITURATES UR-MCNC: NEGATIVE NG/ML
BASOPHILS # BLD: 1.2 % (ref 0–2)
BENZODIAZ UR SCN-MCNC: NEGATIVE NG/ML
BILIRUB SERPL-MCNC: 3.1 MG/DL (ref 0.2–1)
BILIRUB UR STRIP.AUTO-MCNC: (no result) MG/DL
BNP SERPL-MCNC: 332.6 PG/ML (ref 5–125)
BNP SERPL-MCNC: 336.8 PG/ML (ref 5–125)
BUN SERPL-MCNC: 24.2 MG/DL (ref 7–18)
CALCIUM SERPL-MCNC: 9.1 MG/DL (ref 8.5–10.1)
CASTS URNS QL MICRO: 1 /LPF (ref 0–8)
CHLORIDE SERPL-SCNC: 99 MMOL/L (ref 98–107)
CO2 SERPL-SCNC: 21 MMOL/L (ref 21–32)
COCAINE UR-MCNC: NEGATIVE NG/ML
COLOR UR: (no result)
CREAT SERPL-MCNC: 1 MG/DL (ref 0.55–1.3)
DEPRECATED RDW RBC AUTO: 14 % (ref 11.9–15.9)
EOSINOPHIL # BLD: 0.3 % (ref 0–4.5)
EPITH CASTS URNS QL MICRO: 0.2 /HPF
GLUCOSE SERPL-MCNC: 109 MG/DL (ref 74–106)
HCT VFR BLD CALC: 42.3 % (ref 35.4–49)
HGB BLD-MCNC: 14.6 GM/DL (ref 11.7–16.9)
KETONES UR QL STRIP: (no result)
LEUKOCYTE ESTERASE UR QL STRIP.AUTO: (no result)
LYMPHOCYTES # BLD: 17.7 % (ref 8–40)
MCH RBC QN AUTO: 33.1 PG (ref 25.7–33.7)
MCHC RBC AUTO-ENTMCNC: 34.6 G/DL (ref 32–35.9)
MCV RBC: 95.6 FL (ref 80–96)
METHADONE UR-MCNC: NEGATIVE NG/ML
MONOCYTES # BLD AUTO: 16.7 % (ref 3.8–10.2)
NEUTROPHILS # BLD: 64.1 % (ref 42.8–82.8)
NITRITE UR QL STRIP: NEGATIVE
OPIATES UR QL SCN: NEGATIVE NG/ML
PCP UR QL SCN: NEGATIVE NG/ML
PH UR: 5.5 [PH] (ref 5–8)
PLATELET # BLD AUTO: 67 K/MM3 (ref 134–434)
PMV BLD: 9.3 FL (ref 7.5–11.1)
POTASSIUM SERPLBLD-SCNC: 3.5 MMOL/L (ref 3.5–5.1)
PROT SERPL-MCNC: 9.3 G/DL (ref 6.4–8.2)
PROT UR QL STRIP: (no result)
PROT UR QL STRIP: NEGATIVE
RBC # BLD AUTO: 2 /HPF (ref 0–4)
RBC # BLD AUTO: 4.43 M/MM3 (ref 4–5.6)
SODIUM SERPL-SCNC: 130 MMOL/L (ref 136–145)
SP GR UR: 1.02 (ref 1.01–1.03)
UROBILINOGEN UR STRIP-MCNC: 1 MG/DL (ref 0.2–1)
WBC # BLD AUTO: 5.1 K/MM3 (ref 4–10)
WBC # UR AUTO: 1 /HPF (ref 0–5)

## 2019-10-17 NOTE — PDOC
History of Present Illness





- General


History Source: Patient


Exam Limitations: Clinical Condition





- History of Present Illness


Initial Comments: 





10/17/19 11:52


Patient with past medical history of hypertension brought in by family with 

complaint of 2-day history of pain to bilateral lower posterior thigh, groin 

area and posterior knee.  Patient's daughter also complain patient has been 

having visual hallucination for the past 2 days.  Daughter reports patient 

attempted to lift a refrigerator yesterday and has been complaining of water 

running when there is no one around him and pointing unchanged on the anterior.

  Daughter denies patient being aggressive.  Patient denies suicidal or 

homicidal ideation.  Patient denies shortness of breath, chest pain, dizziness, 

nausea or vomiting.  Daughter reports patient has been having excessive 

urination with urinary accidents sometimes for few days now.  Denies any 

psychiatric history of depression, bipolar or any disorders


Is this a multiple visit Asthma Patient?: No


Timing/Duration: other (2 days)





<Burt Niño - Last Filed: 10/17/19 16:11>





<Getachew Zhang - Last Filed: 10/18/19 14:51>





- General


Chief Complaint: Pain, Acute


Stated Complaint: Psychiatric/Pain on Rt.LE


Time Seen by Provider: 10/17/19 11:17





Past History





- Past Medical History


COPD: No


HTN: Yes


Hypercholesterolemia: Yes





- Immunization History


Immunization Up to Date: No





- Psycho Social/Smoking Cessation Hx


Smoking History: Never smoked


Have you smoked in the past 12 months: No


Information on smoking cessation initiated: No


Hx Alcohol Use: No


Drug/Substance Use Hx: No





<Burt Niño - Last Filed: 10/17/19 16:11>





<Getachew Zhang - Last Filed: 10/18/19 14:51>





- Past Medical History


Allergies/Adverse Reactions: 


 Allergies











Allergy/AdvReac Type Severity Reaction Status Date / Time


 


No Known Allergies Allergy   Verified 02/19/18 13:22











Home Medications: 


Ambulatory Orders





RX: Metoprolol Succinate 25 mg PO DAILY 02/19/18 


Atorvastatin Ca [Lipitor] 20 mg PO DAILY 10/17/19 


RX: Aspirin 81 mg PO DAILY 10/17/19 


RX: Losartan Potassium 25 mg PO DAILY 10/17/19 


RX: Spironolactone 25 mg PO DAILY 10/17/19 











**Review of Systems





- Review of Systems


Able to Perform ROS?: Yes


Is the patient limited English proficient: No


Constitutional: No: Chills, Fever, Malaise


HEENTM: No: Symptoms Reported, See HPI, Eye Pain, Blurred Vision, Tearing, 

Recent change in vision, Double Vision, Cataracts, Ear Pain, Ocular Prothesis, 

Ear Discharge, Nose Pain, Nose Congestion, Tinnitus, Nose Bleeding, Hearing Loss

, Throat Pain, Throat Swelling, Mouth Pain, Dental Problems, Difficulty 

Swallowing, Mouth Swelling, Other


Respiratory: No: Symptoms reported, See HPI, Cough, Orthopnea, Shortness of 

Breath, SOB with Exertion, SOB at Rest, Stridor, Wheezing, Productive cough, 

Hemoptysis, Other


Cardiac (ROS): No: Symptoms Reported, See HPI, Chest Pain, Edema, Irregular 

Heart Rate, Lightheadedness, Palpitations, Syncope, Chest Tightness, Other


ABD/GI: No: Symptoms Reported, Nausea, Vomiting, Abdominal cramping


: Yes: Symptoms Reported, Frequency, Incontinence, Pain (b/l groin pain).  No

: Dysuria, Discharge, Testicular Mass, Testicular Swelling, Testicular Pain


Musculoskeletal: Yes: Symptoms Reported, Muscle Pain (b/l posterior thigh and 

popliteal fossa)


Integumentary: No: Symptoms Reported


Neurological: Yes: Tremors (hand tremors).  No: Headache, Dizziness


Psychiatric: Yes: Other (visual hallucination).  No: Anxiety


Endocrine: No: Symptoms Reported


All Other Systems: Reviewed and Negative





<Burt Niño - Last Filed: 10/17/19 16:11>





*Physical Exam





- Vital Signs


 Last Vital Signs











Temp Pulse Resp BP Pulse Ox


 


 98.4 F   102 H  16   163/97   96 


 


 10/17/19 10:28  10/17/19 10:28  10/17/19 10:28  10/17/19 10:28  10/17/19 10:28














- Physical Exam


Comments: 





10/17/19 11:59


GENERAL:


Well developed, well nourished. Awake and alert. No acute distress.


HEENT:


Normocephalic, atraumatic. PERRLA, EOMI. No conjunctival pallor. Sclera are non-

icteric. Moist mucous membranes. Oropharynx is clear.


NECK: 


Supple. Full ROM. No JVD. Carotid pulses 2+ and symmetric, without bruits. No 

thyromegaly. No lymphadenopathy.


CARDIOVASCULAR:


Regular rate and rhythm. No murmurs, rubs, or gallops. Distal pulses are 2+ and 

symmetric. 


PULMONARY: 


No evidence of respiratory distress. Lungs clear to auscultation bilaterally. 

No wheezing, rales or rhonchi.


ABDOMINAL:


Soft. Non-tender. Non-distended. No rebound or guarding. No organomegaly. 

Normoactive bowel sounds. 


MUSCULOSKELETAL 


Normal range of motion at all joints.  Mild tenderness of bilateral groin area 

with increased pain to left groin area.  Mild tenderness to popliteal fossa of 

bilateral knee with increased pain to right popliteal fossa.  No bony 

deformities or tenderness. No CVA tenderness.


EXTREMITIES: 


No cyanosis. No clubbing. No edema. No calf tenderness.


SKIN: 


Warm and dry. Normal capillary refill. No rashes.  


NEUROLOGICAL: 


Alert, awake, appropriate. Cranial nerves 2-12 intact. No deficits to light 

touch  in face, upper extremities and lower extremities. No motor deficits in 

the in face, upper extremities and lower extremities.  Mild essential tremors 

in bilateral hands.  Normal speech.  Gait is normal without ataxia.


PSYCHIATRIC: 


Cooperative. Good eye contact. Appropriate mood and affect.


General Appearance: Yes: Nourished, Appropriately Dressed.  No: Apparent 

Distress





<Burt Niño - Last Filed: 10/17/19 16:11>





- Vital Signs


 Last Vital Signs











Temp Pulse Resp BP Pulse Ox


 


 99.1 F   123 H  20   168/107 H  96 


 


 10/18/19 09:41  10/18/19 09:41  10/18/19 09:41  10/18/19 09:41  10/17/19 20:50














<Getachew Zhang - Last Filed: 10/18/19 14:51>





ED Treatment Course





- LABORATORY


CBC & Chemistry Diagram: 


 10/17/19 11:58





 10/17/19 11:58





- RADIOLOGY


Radiology Studies Ordered: 














 Category Date Time Status


 


 DUPLEX VASCUL US-2LEGS [US] Stat Ultrasound  10/17/19 11:35 Ordered














<Burt Niño - Last Filed: 10/17/19 16:11>





- LABORATORY


CBC & Chemistry Diagram: 


 10/18/19 06:15





 10/18/19 06:15





- ADDITIONAL ORDERS


Additional order review: 














 10/17/19 12:05 Urine Culture - Final





 Urine - Urine Clean Catch    NO GROWTH OBTAINED








 











  10/17/19





  11:58


 


RBC  4.43


 


MCV  95.6


 


MCHC  34.6


 


RDW  14.0  D


 


MPV  9.3


 


Neutrophils %  64.1


 


Lymphocytes %  17.7  D


 


Monocytes %  16.7 H


 


Eosinophils %  0.3


 


Basophils %  1.2














- RADIOLOGY


Radiology Studies Ordered: 














 Category Date Time Status


 


 CHEST X-RAY PORTABLE* [RAD] Stat Radiology  10/17/19 15:09 Completed














- Medications


Given in the ED: 


ED Medications














Discontinued Medications














Generic Name Dose Route Start Last Admin





  Trade Name Freq  PRN Reason Stop Dose Admin


 


Folic Acid 1 mg/ Thiamine HCl  1,000 mls @ 125 mls/hr  10/17/19 15:14  10/17/19 

17:10





100 mg/ Multivitamins/Minerals  IVPB  10/17/19 23:13  125 mls/hr





10 ml/ Sodium Chloride  ONCE ONE   Administration





     





     





     





     


 


Potassium Chloride  10 meq in 100 mls @ 100 mls/hr  10/18/19 09:30  10/18/19 14:

07





  Potassium Chloride 10 Meq Premix Ivpb -  IVPB  10/18/19 12:29  100 mls/hr





  Q60M DEREK   Administration





     





     





     





     


 


Lactulose  20 gm  10/17/19 16:55  10/17/19 17:55





  Cephulac (Oral Use)  PO  10/17/19 16:56  20 gm





  ONCE ONE   Administration





     





     





     





     


 


Lorazepam  1 mg  10/18/19 01:33  10/18/19 02:55





  Ativan Injection -  IVPUSH  10/18/19 01:34  1 mg





  ONCE ONE   Administration





     





     





     





     


 


Lorazepam  1 mg  10/18/19 03:51  10/18/19 04:10





  Ativan Injection -  IVPUSH  10/18/19 03:52  1 mg





  ONCE ONE   Administration





     





     





     





     


 


Lorazepam  2 mg  10/18/19 08:56  10/18/19 09:43





  Ativan Injection -  IVPUSH  10/18/19 08:57  2 mg





  ONCE ONE   Administration





     





     





     





     


 


Magnesium Sulfate  2 gm  10/18/19 09:26  10/18/19 12:21





  Magnesium Sulfate  IVPB  10/18/19 09:27  2 gm





  ONCE ONE   Administration





     





     





     





     


 


Potassium Chloride  40 meq  10/18/19 09:26  10/18/19 09:56





  K-Dur -  PO  10/18/19 09:27  40 meq





  ONCE ONE   Administration





     





     





     





     


 


Potassium Phos/Sodium Phos  1 packet  10/18/19 10:00  10/18/19 12:25





  Phos-Nak Packet -  PO   Not Given





  BID DEREK   





     





     





     





     














<Getachew Zhang - Last Filed: 10/18/19 14:51>





Medical Decision Making





- Medical Decision Making





10/17/19 11:54


Patient with past medical history of hypertension brought in by family with 

complaint of 2-day history of pain to bilateral lower posterior thigh, groin 

area and posterior knee.  Patient's daughter also complain patient has been 

having visual hallucination for the past 2 days.  Daughter reports patient 

attempted to lift a refrigerator yesterday and has been complaining of water 

running when there is no water around him and pointing at things that are not 

there.  Daughter denies patient being aggressive.  Patient denies suicidal or 

homicidal ideation.  Patient denies shortness of breath, chest pain, dizziness, 

nausea or vomiting.  Daughter reports patient has been having excessive 

urination with urinary accidents sometimes for few days now.  Denies any 

psychiatric history of depression, bipolar or any disorders


Exam significant for mild tenderness to bilateral popliteal fossa and groin 

area with increased pain to left groin area.  No testicular tenderness or 

swelling.  Patient with essential tremors on the hand worsening down.  Normal 

neuro exam.


Symptoms likely muscle strain with causing leg and groin pains with UTI versus  

delirium from cystitis.


CBC, CMP, TSH, acetaminophen and salicylic level ordered.  Duplex ultrasound of 

bilateral lower extremity ordered to rule out DVT.  UA and urine culture lab 

ordered.  Will head to head CT to rule out any intracranial occupying mass.  

Treat based on lab and imaging results


10/17/19 13:59


CBC unremarkable.  Chemistry lab shows AST of 147 which is elevated.  Bilirubin 

of 3.1.  Patient symptoms could be attributed to  liver failure causing 

encaphalopathy and tremors could be from asterixis.  Will order ammonia level 

to evaluate for elevated ammonia, alcohol level and HIV test ordered.   

abdominal ultrasound to evaluate liver.  Head CT with no acute findings.  TSH 

level normal.  U tox negative








10/17/19 16:11


microblog sent for admission for altered mental status. ammonium level still 

pending





<Burt Niño - Last Filed: 10/17/19 16:11>





- Medical Decision Making





10/17/19 16:00


Pt seen by the Advanced Practice Provider under my direct supervision


Ancillary studies reviewed





I agree with plan as outlined by the Advanced Practice Provider





Briefly, pt presents to ED with b/l LE pain, and new visual hallucinations for 

2 days


US negative for DVT


Labs suggestive of liver failure, including thrombocytopenia, elevated INR, 

bili 3.1, mild AST elevation. Exam also with asterixis. Possibly 2/2 etoh abuse

, although pt denies drinking more than 2 or 3 drinks per week. Ammonia level 

added. Pt currently cooperative, although intermittently hallucinating. 

Possible DTs? Daughter, however, denies that her father excessively drinks. 





CTH negative for acute pathology. 





He is rectally afebrile, with no headache, stiff neck, rash to suggest 

meningitis or HSV encephalitis. Will hold off on LP for now given low 

likelihood infectious process causing encephalopathy. 





Plan for admission for further observation and w/u of possible liver failure 

and hallucinations.





<Getachew Zhang - Last Filed: 10/18/19 14:51>





Discharge





- Discharge Information


Problems reviewed: Yes





- Admission


Yes





<Burt Niño - Last Filed: 10/17/19 16:11>





<Getachew Zhang - Last Filed: 10/18/19 14:51>





- Discharge Information


Clinical Impression/Diagnosis: 


 Bilateral groin pain





Altered mental status


Qualifiers:


 Altered mental status type: delirium Qualified Code(s): R41.0 - Disorientation

, unspecified





Condition: Stable

## 2019-10-17 NOTE — PN
Teaching Attending Note


Name of Resident: Elizabeth French





ATTENDING PHYSICIAN STATEMENT





I saw and evaluated the patient.


I reviewed the resident's note and discussed the case with the resident.


I agree with the resident's findings and plan as documented with exceptions 

below.








SUBJECTIVE:


54 yom with PMHx of cardiomyopathy, ?Cardiac surgery/ICD, Moderate to Severe 

Aortic stenosis, HTN, HLD, ETOH use brought in by daughter for visual 

hallucinations and unsteady gait for the last 2-3 days. Daughter reports 

patient has long standing h/o ETOH use, drinks' More than he should', atleast 2-

3 times a week, but patient lives alone and not sure how much he might be 

drinking. Per her,he has not been taking his medications regularly and feels 

stopped drinking 'cold turkey' 3 days ago. 


Patient currently denies any chest pain, palpitations, dizziness, dyspnea, cough

, nausea, vomiting, abdominal pain or urinary symptoms.


Per discussion with pharmacy, patient has not filled his medications since 2018.


CXR with sternal sutures and ?ICD, but patient and family unable to provide the 

details. 





OBJECTIVE:


 Vital Signs











 Period  Temp  Pulse  Resp  BP Sys/Lombardi  Pulse Ox


 


 Last 24 Hr  98.4 F-100.0 F    16-17  115-163/86-97  96-98








 Intake & Output











 10/14/19 10/15/19 10/16/19 10/17/19





 23:59 23:59 23:59 23:59


 


Weight    440 lb 14.792 oz











GENERAL: Awake, alert, oriented, no acute distress,facial flushing


HEAD: Normal with no signs of trauma.


EYES: Pupils equal, round and reactive to light, extraocular movements intact, 

sclera anicteric, conjunctiva clear. No lid lag.


EARS, NOSE, THROAT: Ears normal, nares patent, oropharynx clear without 

exudates.dry  mucous membranes.


NECK: Normal range of motion, supple , no JVD


LUNGS: Breath sounds equal, clear to auscultation bilaterally. No wheezes, and 

no crackles. No accessory muscle use.


HEART: Regular rate and rhythm, normal S1 and S2 


ABDOMEN: Soft, obese, NT throughout, pos bowel sounds


MUSCULOSKELETAL: Normal range of motion at all joints. No bony deformities or 

tenderness. No CVA tenderness.


UPPER EXTREMITIES: 2+ pulses, warm, well-perfused. No cyanosis. No clubbing. No 

peripheral edema. Pos upper extermity tremors


LOWER EXTREMITIES: 2+ pulses, warm, well-perfused. No calf tenderness. No 

peripheral edema. 


NEUROLOGICAL:  AAOx3, Cranial nerves II-XII intact. Normal speech. facial 

symmetry, EOMI, PERRL, power 5/5, gait not observed, 


PSYCHIATRIC: Cooperative. reports visual hallucinations to daughter, but vague 

currently


SKIN: Warm, dry, decreased turgor, no rashes or lesions noted, normal capillary 

refill. 





 Home Medications











 Medication  Instructions  Recorded


 


Metoprolol Succinate 25 mg PO DAILY 02/19/18


 


Aspirin 81 mg PO DAILY 10/17/19


 


Atorvastatin Ca [Lipitor] 20 mg PO DAILY 10/17/19


 


Losartan Potassium 25 mg PO DAILY 10/17/19


 


Spironolactone 25 mg PO DAILY 10/17/19








Active Medications





Aspirin (Asa -)  81 mg PO DAILY DEREK


Atorvastatin Calcium (Lipitor -)  20 mg PO HS [facility]


Folic Acid 1 mg/ Thiamine HCl 100 mg/ Multivitamins/Minerals 10 ml/ Sodium 

Chloride  1,000 mls @ 125 mls/hr IVPB ONCE ONE


   Stop: 10/17/19 23:13


   Last Admin: 10/17/19 17:10 Dose:  125 mls/hr


Losartan Potassium (Cozaar -)  25 mg PO DAILY [facility]


Metoprolol Succinate (Toprol Xl -)  25 mg PO DAILY [facility]


Spironolactone (Aldactone -)  25 mg PO DAILY [facility]





 Laboratory Results - last 24 hr











  10/17/19 10/17/19 10/17/19





  11:58 11:58 11:58


 


WBC   5.1 


 


RBC   4.43 


 


Hgb   14.6 


 


Hct   42.3  D 


 


MCV   95.6 


 


MCH   33.1  D 


 


MCHC   34.6 


 


RDW   14.0  D 


 


Plt Count   67 L D 


 


MPV   9.3 


 


Absolute Neuts (auto)   3.3 


 


Neutrophils %   64.1 


 


Lymphocytes %   17.7  D 


 


Monocytes %   16.7 H 


 


Eosinophils %   0.3 


 


Basophils %   1.2 


 


Nucleated RBC %   0 


 


Sodium    130 L


 


Potassium    3.5


 


Chloride    99


 


Carbon Dioxide    21


 


Anion Gap    9


 


BUN    24.2 H


 


Creatinine    1.0


 


Est GFR (CKD-EPI)AfAm    99.15


 


Est GFR (CKD-EPI)NonAf    85.55


 


Random Glucose    109 H


 


Lactic Acid   


 


Calcium    9.1


 


Total Bilirubin    3.1 H


 


AST    147 H


 


ALT    40


 


Alkaline Phosphatase    98


 


Ammonia   


 


Creatine Kinase  1298 H  


 


Creatine Kinase Index  0.8  


 


CK-MB (CK-2)  10.7 H  


 


Troponin I  0.06 H   0.07 H


 


B-Natriuretic Peptide  336.8 H   332.6 H


 


Total Protein    9.3 H


 


Albumin    3.4


 


TSH  1.42  D  


 


Urine Color   


 


Urine Appearance   


 


Urine pH   


 


Ur Specific Gravity   


 


Urine Protein   


 


Urine Glucose (UA)   


 


Urine Ketones   


 


Urine Blood   


 


Urine Nitrite   


 


Urine Bilirubin   


 


Urine Urobilinogen   


 


Ur Leukocyte Esterase   


 


Urine WBC (Auto)   


 


Urine RBC (Auto)   


 


Urine Casts (Auto)   


 


U Epithel Cells (Auto)   


 


Urine Bacteria (Auto)   


 


Salicylates   


 


Opiates Screen   


 


Methadone Screen   


 


Acetaminophen   


 


Barbiturate Screen   


 


Phencyclidine Screen   


 


Ur Amphetamines Screen   


 


MDMA (Ecstasy) Screen   


 


Benzodiazepines Screen   


 


Cocaine Screen   


 


U Marijuana (THC) Screen   


 


Alcohol, Quantitative   


 


HIV 1&2 Antibody Screen   


 


HIV P24 Antigen   














  10/17/19 10/17/19 10/17/19





  12:05 12:05 12:16


 


WBC   


 


RBC   


 


Hgb   


 


Hct   


 


MCV   


 


MCH   


 


MCHC   


 


RDW   


 


Plt Count   


 


MPV   


 


Absolute Neuts (auto)   


 


Neutrophils %   


 


Lymphocytes %   


 


Monocytes %   


 


Eosinophils %   


 


Basophils %   


 


Nucleated RBC %   


 


Sodium   


 


Potassium   


 


Chloride   


 


Carbon Dioxide   


 


Anion Gap   


 


BUN   


 


Creatinine   


 


Est GFR (CKD-EPI)AfAm   


 


Est GFR (CKD-EPI)NonAf   


 


Random Glucose   


 


Lactic Acid   


 


Calcium   


 


Total Bilirubin   


 


AST   


 


ALT   


 


Alkaline Phosphatase   


 


Ammonia   


 


Creatine Kinase   


 


Creatine Kinase Index   


 


CK-MB (CK-2)   


 


Troponin I   


 


B-Natriuretic Peptide   


 


Total Protein   


 


Albumin   


 


TSH   


 


Urine Color   Dk yellow 


 


Urine Appearance   Clear 


 


Urine pH   5.5 


 


Ur Specific Gravity   1.018 


 


Urine Protein   2+ H 


 


Urine Glucose (UA)   Negative 


 


Urine Ketones   Trace H 


 


Urine Blood   1+ H 


 


Urine Nitrite   Negative 


 


Urine Bilirubin   1+ H 


 


Urine Urobilinogen   1.0 


 


Ur Leukocyte Esterase   Trace 


 


Urine WBC (Auto)   1 


 


Urine RBC (Auto)   2 


 


Urine Casts (Auto)   1 


 


U Epithel Cells (Auto)   0.2 


 


Urine Bacteria (Auto)   1.7 


 


Salicylates    < 1.7 L


 


Opiates Screen  Negative  


 


Methadone Screen  Negative  


 


Acetaminophen   


 


Barbiturate Screen  Negative  


 


Phencyclidine Screen  Negative  


 


Ur Amphetamines Screen  Negative  


 


MDMA (Ecstasy) Screen  Negative  


 


Benzodiazepines Screen  Negative  


 


Cocaine Screen  Negative  


 


U Marijuana (THC) Screen  Negative  


 


Alcohol, Quantitative   


 


HIV 1&2 Antibody Screen   


 


HIV P24 Antigen   














  10/17/19 10/17/19 10/17/19





  14:20 14:20 15:06


 


WBC   


 


RBC   


 


Hgb   


 


Hct   


 


MCV   


 


MCH   


 


MCHC   


 


RDW   


 


Plt Count   


 


MPV   


 


Absolute Neuts (auto)   


 


Neutrophils %   


 


Lymphocytes %   


 


Monocytes %   


 


Eosinophils %   


 


Basophils %   


 


Nucleated RBC %   


 


Sodium   


 


Potassium   


 


Chloride   


 


Carbon Dioxide   


 


Anion Gap   


 


BUN   


 


Creatinine   


 


Est GFR (CKD-EPI)AfAm   


 


Est GFR (CKD-EPI)NonAf   


 


Random Glucose   


 


Lactic Acid   


 


Calcium   


 


Total Bilirubin   


 


AST   


 


ALT   


 


Alkaline Phosphatase   


 


Ammonia    37.00 H


 


Creatine Kinase   


 


Creatine Kinase Index   


 


CK-MB (CK-2)   


 


Troponin I   


 


B-Natriuretic Peptide   


 


Total Protein   


 


Albumin   


 


TSH   


 


Urine Color   


 


Urine Appearance   


 


Urine pH   


 


Ur Specific Gravity   


 


Urine Protein   


 


Urine Glucose (UA)   


 


Urine Ketones   


 


Urine Blood   


 


Urine Nitrite   


 


Urine Bilirubin   


 


Urine Urobilinogen   


 


Ur Leukocyte Esterase   


 


Urine WBC (Auto)   


 


Urine RBC (Auto)   


 


Urine Casts (Auto)   


 


U Epithel Cells (Auto)   


 


Urine Bacteria (Auto)   


 


Salicylates   


 


Opiates Screen   


 


Methadone Screen   


 


Acetaminophen   <2.0 


 


Barbiturate Screen   


 


Phencyclidine Screen   


 


Ur Amphetamines Screen   


 


MDMA (Ecstasy) Screen   


 


Benzodiazepines Screen   


 


Cocaine Screen   


 


U Marijuana (THC) Screen   


 


Alcohol, Quantitative  < 3.0  


 


HIV 1&2 Antibody Screen   


 


HIV P24 Antigen   














  10/17/19 10/17/19





  15:06 16:40


 


WBC  


 


RBC  


 


Hgb  


 


Hct  


 


MCV  


 


MCH  


 


MCHC  


 


RDW  


 


Plt Count  


 


MPV  


 


Absolute Neuts (auto)  


 


Neutrophils %  


 


Lymphocytes %  


 


Monocytes %  


 


Eosinophils %  


 


Basophils %  


 


Nucleated RBC %  


 


Sodium  


 


Potassium  


 


Chloride  


 


Carbon Dioxide  


 


Anion Gap  


 


BUN  


 


Creatinine  


 


Est GFR (CKD-EPI)AfAm  


 


Est GFR (CKD-EPI)NonAf  


 


Random Glucose  


 


Lactic Acid   1.4


 


Calcium  


 


Total Bilirubin  


 


AST  


 


ALT  


 


Alkaline Phosphatase  


 


Ammonia  


 


Creatine Kinase  


 


Creatine Kinase Index  


 


CK-MB (CK-2)  


 


Troponin I  


 


B-Natriuretic Peptide  


 


Total Protein  


 


Albumin  


 


TSH  


 


Urine Color  


 


Urine Appearance  


 


Urine pH  


 


Ur Specific Gravity  


 


Urine Protein  


 


Urine Glucose (UA)  


 


Urine Ketones  


 


Urine Blood  


 


Urine Nitrite  


 


Urine Bilirubin  


 


Urine Urobilinogen  


 


Ur Leukocyte Esterase  


 


Urine WBC (Auto)  


 


Urine RBC (Auto)  


 


Urine Casts (Auto)  


 


U Epithel Cells (Auto)  


 


Urine Bacteria (Auto)  


 


Salicylates  


 


Opiates Screen  


 


Methadone Screen  


 


Acetaminophen  


 


Barbiturate Screen  


 


Phencyclidine Screen  


 


Ur Amphetamines Screen  


 


MDMA (Ecstasy) Screen  


 


Benzodiazepines Screen  


 


Cocaine Screen  


 


U Marijuana (THC) Screen  


 


Alcohol, Quantitative  


 


HIV 1&2 Antibody Screen  Negative 


 


HIV P24 Antigen  Negative 








CT brain results reviewed


CXR image and results reviewed


Abdominal US results noted





ASSESSMENT AND PLAN:


54 yom with PMHx of cardiomyopathy, ?Cardiac surgery/ICD, Moderate to Severe 

Aortic stenosis, HTN, HLD, ETOH use admitted with hallucinations, unsteady gait





-Visual hallucinations, suspicious for  ETOH withdrawal, r/o hepatic 

encephalopathy/wernicke's encephlopathy, underlying neurological/psych process


-ETOH use/withdrawal


-Abnormal LFTs/Mixed hyperbilirubinemia, suspect from above


-Elevated troponin, ?demand mediated from above, r/o ACS


-Acute on chronic thrombocytopenia, ?From alcohol related bone marrow 

suppression


-Cardiomyopathy, ?s/p cardiac surgery/?ICD


-Moderate to severe Aortic stenosis


-HTN


-HLD





Plan:


Ativan detox protocol, 


Detox consult. 


Folate/thiamine,  banana bag x1, avoid additional volume. 


Monitor volume status closely. 


Lactulose.


Trend LFTs, Check INR. Check hepatitis panel. 


GI input 


Neurology input. Check folate, B12. 


Telemetry, cycle trop. ASA with close monitoring of platelets.


patient not compliant with his meds. Retrieve more info from outpatient PCP/

Cardiology


patient/daughter unable to provide currently. 


Repeat 2D echo. 


Cardiology input. Continue toprol/spironolactone/losartan. Hold statin till 

LFts stabilize


DVTPPX SCD


PT eval


Dispo pendign clinical improvement.


Discussed with patient and daughter at bedside in detail, all questions 

answered. 


Total admit time 65 min.

## 2019-10-17 NOTE — HP
CHIEF COMPLAINT: AMS





PCP:   Dr. Chris Cuadra





HISTORY OF PRESENT ILLNESS:


Patient is a 55 y/o male with a history of HTN, HLD, and HFrER with pacemaker 

who presents for 2 days of hallucinations. Patient has never had a history of 

this and has no changes in his life. Patient is non compliant with his 

medications and does not know the names. he only drinks 2-3 beers a week. Per 

his daughter he has been seeing things that are not there and has been grabbing 

at things. patient has also been weaker and unable to walk as well. Patient 

typically has pain in his lower extremities due to his arthritis. patient has 

been with his wife for over 30 years. patient denies any recent sick contacts. 

State she follows up with a cardiologist but does not know the name. Patient 

feels hot and has not had a bowel movement in two days. Denies headache, nausea

, vomiting, chest pain, dizziness, blurry vision, or tingling in extremities. 





ER course was notable for:


(1)


(2)


(3)





Recent Travel:  denies





PAST MEDICAL HISTORY: HTN, HLD, and HFrER with pacemaker





PAST SURGICAL HISTORY: pacemaker placement, "open heart surgery" at [facility] 1 

year ago





Social History:


Smoking: never


Alcohol: 2-3 beers a week


Drugs: denies


Family hx: mom : DM , heart disease, no dementia in the family


Allergies





No Known Allergies Allergy (Verified 02/19/18 13:22)


 








HOME MEDICATIONS:


 Home Medications











 Medication  Instructions  Recorded


 


Metoprolol Succinate 25 mg PO DAILY 02/19/18








REVIEW OF SYSTEMS


CONSTITUTIONAL: 


Absent:  fever, chills, diaphoresis, generalized weakness, malaise, loss of 

appetite, weight change


HEENT: 


Absent:  rhinorrhea, nasal congestion, throat pain, throat swelling, difficulty 

swallowing, mouth swelling, ear pain, eye pain, visual changes


CARDIOVASCULAR: 


Absent: chest pain, syncope, palpitations, irregular heart rate, lightheadedness

, peripheral edema


RESPIRATORY: 


Absent: cough, shortness of breath, dyspnea with exertion, orthopnea, wheezing, 

stridor, hemoptysis


GASTROINTESTINAL:


Absent: abdominal pain, abdominal distension, nausea, vomiting, diarrhea, 

constipation, melena, hematochezia


GENITOURINARY: 


Absent: dysuria, frequency, urgency, hesitancy, hematuria, flank pain, genital 

pain


MUSCULOSKELETAL: 


Absent: myalgia, arthralgia, joint swelling, back pain, neck pain


SKIN: 


Absent: rash, itching, pallor


HEMATOLOGIC/IMMUNOLOGIC: 


Absent: easy bleeding, easy bruising, lymphadenopathy, frequent infections


ENDOCRINE:


Absent: unexplained weight gain, unexplained weight loss, heat intolerance, 

cold intolerance


NEUROLOGIC:  mental status changes,


Absent: headache, focal weakness or paresthesias, dizziness, unsteady gait, 

seizure, bladder or bowel incontinence


PSYCHIATRIC: 


Absent: anxiety, depression, suicidal or homicidal ideation, hallucinations.








PHYSICAL EXAMINATION


 Vital Signs - 24 hr











  10/17/19 10/17/19 10/17/19





  10:28 15:52 16:09


 


Temperature 98.4 F 100.0 F H 


 


Pulse Rate 102 H  


 


Pulse Rate [   88





Right Radial]   


 


Respiratory 16  17





Rate   


 


Blood Pressure 163/97  


 


Blood Pressure   115/86





[Right Arm]   


 


O2 Sat by Pulse 96  98





Oximetry (%)   











GENERAL: Awake, alert, oriented x2, diaphoretic, obese


HEAD: Normal with no signs of trauma.


EYES: Pupils equal, round and reactive to light, scleral icterus, no nystagmus, 

no diploplia


EARS, NOSE, THROAT: Moist mucous membranes. icterus under tounge


NECK: no JVD


LUNGS: Breath sounds equal, clear to auscultation bilaterally. 


HEART: Regular rate and rhythm, normal S1 and S2 without murmur, rub or gallop.


ABDOMEN: Soft, nontender, not distended, normoactive bowel sounds, no guarding, 

no rebound, no masses.  No hepatomegaly or  splenomegaly. 


MUSCULOSKELETAL: Normal range of motion at all joints. No bony deformities or 

tenderness. No CVA tenderness.


LOWER EXTREMITIES: 2+ pulses, warm, well-perfused. No calf tenderness. No 

peripheral edema. 


NEUROLOGICAL:  Cranial nerves II-XII intact. Normal speech. unable to determine 

gait due to weakness and unsteadiness, finger to nose undeterminable


SKIN: Warm, dry, normal turgor, no rashes or lesions noted, normal capillary 

refill. 


  CBC, BMP





 10/17/19 11:58 





 10/17/19 11:58 























ASSESSMENT/PLAN:


Patient is a 55 y/o male with a history of HTN, HLD, and HFrER with pacemaker 

who presents for 2 days of hallucinations.





#AMS


   - likely 2/2 to hepatic encephalopahty with icterus, 


   - likely with alcohol use, patient denies


   - f/u fractionated bili, hepatitis panel, f/u TSH, f/u PT/INR


   - f/u CT of abd/pelvis


   - f/u neuro


   - f/u GI


   - head CT: no acute pathology


   - ammonia level increased at 37, give one dose of lacutlose


   - f/u VB12, f/u folate





#tropinemia


   - f/u 2/2 trop 


   - monitor patient on tele


   - continuous cardiac monitoring


   - f/u repeat echo





#thrombocytopenia


   - hold medical AC


   - hold ASA in setting


   - f/u PT/INR





#HLD


   - continue atorvastatin





#HTN


   -continue metoprolol and losartan





#HF


   - continue spironolactone


   - per patient has a ppm, ? ICD


   - f/u with Cardio





DVT ppx


   - SCD's





FEN


   - sodium controlled diet





Dispo: monitor on tele





Visit type





- Emergency Visit


Emergency Visit: Yes


ED Registration Date: 10/17/19


Care time: The patient presented to the Emergency Department on the above date 

and was hospitalized for further evaluation of their emergent condition.





- New Patient


This patient is new to me today: Yes


Date on this admission: 10/18/19





- Critical Care


Critical Care patient: No





ATTENDING PHYSICIAN STATEMENT





I saw and evaluated the patient.


I reviewed the resident's note and discussed the case with the resident.


I agree with the resident's findings and plan as documented.








SUBJECTIVE:








OBJECTIVE:








ASSESSMENT AND PLAN:

## 2019-10-18 LAB
ALBUMIN SERPL-MCNC: 3 G/DL (ref 3.4–5)
ALP SERPL-CCNC: 84 U/L (ref 45–117)
ALT SERPL-CCNC: 35 U/L (ref 13–61)
ANION GAP SERPL CALC-SCNC: 10 MMOL/L (ref 8–16)
AST SERPL-CCNC: 117 U/L (ref 15–37)
BASOPHILS # BLD: 1 % (ref 0–2)
BILIRUB SERPL-MCNC: 2.7 MG/DL (ref 0.2–1)
BUN SERPL-MCNC: 13.4 MG/DL (ref 7–18)
CALCIUM SERPL-MCNC: 8.4 MG/DL (ref 8.5–10.1)
CHLORIDE SERPL-SCNC: 105 MMOL/L (ref 98–107)
CO2 SERPL-SCNC: 22 MMOL/L (ref 21–32)
CREAT SERPL-MCNC: 0.8 MG/DL (ref 0.55–1.3)
DEPRECATED RDW RBC AUTO: 14 % (ref 11.9–15.9)
EOSINOPHIL # BLD: 0.3 % (ref 0–4.5)
GGT SERPL-CCNC: 635 U/L (ref 5–85)
GLUCOSE SERPL-MCNC: 101 MG/DL (ref 74–106)
HCT VFR BLD CALC: 37.9 % (ref 35.4–49)
HGB BLD-MCNC: 13.1 GM/DL (ref 11.7–16.9)
INR BLD: 1.29 (ref 0.83–1.09)
INR BLD: 1.3 (ref 0.83–1.09)
LYMPHOCYTES # BLD: 15.7 % (ref 8–40)
MAGNESIUM SERPL-MCNC: 1.5 MG/DL (ref 1.8–2.4)
MCH RBC QN AUTO: 33.5 PG (ref 25.7–33.7)
MCHC RBC AUTO-ENTMCNC: 34.6 G/DL (ref 32–35.9)
MCV RBC: 96.9 FL (ref 80–96)
MONOCYTES # BLD AUTO: 16.7 % (ref 3.8–10.2)
NEUTROPHILS # BLD: 66.3 % (ref 42.8–82.8)
PHOSPHATE SERPL-MCNC: 2.2 MG/DL (ref 2.5–4.9)
PLATELET # BLD AUTO: 58 K/MM3 (ref 134–434)
PMV BLD: 8.9 FL (ref 7.5–11.1)
POTASSIUM SERPLBLD-SCNC: 3.4 MMOL/L (ref 3.5–5.1)
PROT SERPL-MCNC: 8.4 G/DL (ref 6.4–8.2)
PT PNL PPP: 15.3 SEC (ref 9.7–13)
PT PNL PPP: 15.4 SEC (ref 9.7–13)
RBC # BLD AUTO: 3.92 M/MM3 (ref 4–5.6)
SODIUM SERPL-SCNC: 137 MMOL/L (ref 136–145)
WBC # BLD AUTO: 5 K/MM3 (ref 4–10)

## 2019-10-18 RX ADMIN — LACTULOSE SCH: 20 SOLUTION ORAL at 23:08

## 2019-10-18 RX ADMIN — LOSARTAN POTASSIUM SCH MG: 25 TABLET, FILM COATED ORAL at 09:49

## 2019-10-18 RX ADMIN — LORAZEPAM PRN MG: 2 INJECTION INTRAMUSCULAR; INTRAVENOUS at 16:34

## 2019-10-18 RX ADMIN — LORAZEPAM PRN MG: 2 INJECTION INTRAMUSCULAR; INTRAVENOUS at 20:15

## 2019-10-18 RX ADMIN — LORAZEPAM PRN MG: 2 INJECTION INTRAMUSCULAR; INTRAVENOUS at 12:48

## 2019-10-18 RX ADMIN — POTASSIUM CHLORIDE SCH MLS/HR: 7.46 INJECTION, SOLUTION INTRAVENOUS at 16:19

## 2019-10-18 RX ADMIN — SPIRONOLACTONE SCH MG: 25 TABLET, FILM COATED ORAL at 09:49

## 2019-10-18 RX ADMIN — POTASSIUM CHLORIDE SCH MLS/HR: 7.46 INJECTION, SOLUTION INTRAVENOUS at 15:07

## 2019-10-18 RX ADMIN — POTASSIUM CHLORIDE SCH MLS/HR: 7.46 INJECTION, SOLUTION INTRAVENOUS at 14:07

## 2019-10-18 NOTE — CON.CARD
Consult


Consult Specialty:: Cardiology


Referred by:: Hospitalist Medicine


Reason for Consultation:: s/p PPM, s/p SAVR





- History of Present Illness


Chief Complaint: Hallucinations, ETOH


History of Present Illness: 


54 year old male withhistory of HTN, HLD, bicuspid AV with critical AS s/p 

bioprosthetic SAVR with magna 25, short IABP support and dual chamber pacemaker 

implantation for CHB 02/27/2018, ETOH use brought in by daughter for visual 

hallucinations and unsteady gait for the last 2-3 days. Daughter reports 

patient has long standing h/o ETOH use, at least 2-3 times a week, but patient 

lives alone and not sure how much he might be drinking. Per her, he has not 

been taking his medications regularly and feels stopped drinking 'cold turkey' 

3 days ago. Patient is currently confused, agitated, restrained, receiving IV 

Ativan. Per discussion with pharmacy, patient has not filled his medications 

since 2018. Has not been compliant with office visits last visit 03/22/2018, 

uncooperative with echo.











- History Source


History Provided By: Medical Record


Limitations to Obtaining History: Poor Historian





- Past Medical History


Cardio/Vascular: Yes: HTN, Hyperlipdemia





- Alcohol/Substance Use


Hx Alcohol Use: Yes





- Smoking History


Smoking history: Never smoked


Have you smoked in the past 12 months: No





Home Medications





- Allergies


Allergies/Adverse Reactions: 


 Allergies











Allergy/AdvReac Type Severity Reaction Status Date / Time


 


No Known Allergies Allergy   Verified 02/19/18 13:22














- Home Medications


Home Medications: 


Ambulatory Orders





Metoprolol Succinate 25 mg PO DAILY 02/19/18 


Aspirin 81 mg PO DAILY 10/17/19 


Atorvastatin Ca [Lipitor] 20 mg PO DAILY 10/17/19 


Losartan Potassium 25 mg PO DAILY 10/17/19 


Spironolactone 25 mg PO DAILY 10/17/19 











Review of Systems





- Review of Systems


Neurological: reports: Confusion, Unsteady Gait


Vital Signs: 


 Vital Signs











Temperature  99.1 F   10/18/19 09:41


 


Pulse Rate  123 H  10/18/19 09:41


 


Respiratory Rate  20   10/18/19 09:41


 


Blood Pressure  168/107 H  10/18/19 09:41


 


O2 Sat by Pulse Oximetry (%)  96   10/17/19 20:50











Constitutional: Yes: No Distress, Other (Agitated)


Neck: Yes: Supple


Respiratory: Yes: Regular, Diminished


Gastrointestinal: Yes: Normal Bowel Sounds, Soft


Cardiovascular: Yes: Regular Rate and Rhythm


JVD: No


Carotid Bruit: No


Heart Sounds: Yes: S1, S2


Edema: No


Neurological: Yes: Confusion





- Other Data


Labs, Other Data: 


 CBC, BMP





 10/18/19 06:15 





 10/18/19 06:15 





 INR, PTT











INR  1.30  (0.83-1.09)  H  10/18/19  06:15    








 Troponin, BNP











  10/17/19 10/17/19 10/17/19





  11:58 11:58 15:06


 


Troponin I  0.06 H  0.07 H  0.06 H


 


B-Natriuretic Peptide  336.8 H  332.6 H 














  10/18/19





  03:00


 


Troponin I  0.04


 


B-Natriuretic Peptide 








 Troponin, BNP











  10/17/19 10/17/19 10/17/19





  11:58 11:58 15:06


 


Troponin I  0.06 H  0.07 H  0.06 H


 


B-Natriuretic Peptide  336.8 H  332.6 H 














  10/18/19





  03:00


 


Troponin I  0.04


 


B-Natriuretic Peptide 











NS @ 92 RBBB





Ejection Fraction %: LVEF > or = 40 %





Imaging





- Results


Cat Scan: Report Reviewed (CT scan of the abdomen and pelvis revealed an 

enlarged liver and changes consistent with portal hypertension)





Problem List





- Problems


(1) S/P aortic valve replacement with bioprosthetic valve


Code(s): Z95.3 - PRESENCE OF XENOGENIC HEART VALVE   





(2) Pacemaker


Code(s): Z95.0 - PRESENCE OF CARDIAC PACEMAKER   





(3) Complete heart block, post-surgical


Code(s): I97.89 - OTH POSTPROC COMP AND DISORDERS OF THE CIRC SYS, NEC; I44.2 - 

ATRIOVENTRICULAR BLOCK, COMPLETE   





(4) Altered mental status


Code(s): R41.82 - ALTERED MENTAL STATUS, UNSPECIFIED   


Qualifiers: 


   Altered mental status type: delirium   Qualified Code(s): R41.0 - 

Disorientation, unspecified   





(5) Aortic stenosis, severe


Code(s): I35.0 - NONRHEUMATIC AORTIC (VALVE) STENOSIS   





(6) CHF (congestive heart failure)


Code(s): I50.9 - HEART FAILURE, UNSPECIFIED   


Qualifiers: 


   Heart failure type: diastolic   Heart failure chronicity: chronic   

Qualified Code(s): I50.32 - Chronic diastolic (congestive) heart failure   





(7) Thrombocytopenia


Code(s): D69.6 - THROMBOCYTOPENIA, UNSPECIFIED   





(8) Alcohol withdrawal delirium


Code(s): F10.231 - ALCOHOL DEPENDENCE WITH WITHDRAWAL DELIRIUM   





(9) Portal hypertension


Code(s): K76.6 - PORTAL HYPERTENSION   





Assessment/Plan


CT scan of the abdomen and pelvis revealed an enlarged liver and changes 

consistent with portal hypertension


02/20/18 Echo: Moderate dilated LV with severely decreased LV fxn, normal RV 

size and fxn, mod pulm HTN, mod-severe aortic stenosis BINU 0.66 cm^2, MG 34 mmHg

, pacemaker seen


02/23/2018 R&LHc: Severe global HK LVEF 25% with severe AS BINU 0.8 cm^2, MG 47 

mmHg


05/17/2018 Echo: Normal LV size and fxn with impaired LV relaxation, normal RV 

size and fxn, biatrial sizes, normal functioning aortic bioprosthesis, mild AK, 

TR RVSP 31 mmHg, small-moderate pericardial effusion





1. Visual hallucinations, agitation, fait instability suspicious for  ETOH 

withdrawal, r/o hepatic encephalopathy/wernicke's encephlopathy/korsakoff 

psychosis


2. Critical AS due to bicuspid aortic valve s/p bioprosthetic aortic valve 

replacement Magna 25 with brief IABP support


3. Resolved systolic dysfunction with h/o failure and subendocardial ischemia 2/

2 valvular cardiomyopathy


4. CHB s/p dual chamber pacemaker


5. Hypertensive cardiomyopathy


6. Pericardial effusion


7. Hyperlipidemia


8.Thrombocytopenia 2/2 alcohol related bone marrow suppression


9. Abnormal LFTs/Mixed hyperbilirubinemia 2/2 ETOH hepatitis


10. Microalbuminuria


11. Portal hypertension





P:


1. Trend trops, f/u repeat echo to assess ventricular and valve fxn, 

pericardial effusion


2. Resume Toprol XL 25 qd, losartan 25 qd, aldactone 25 qd, ASA 81 qd with 

uptitration as hemodynamics tolerate


3. Detox, IV ativan as needed, trend LFTs, hold statin until LFT normalize, 

lactulose


4. Thank you for consultative opportunity

## 2019-10-18 NOTE — PN
Teaching Attending Note


Name of Resident: Chepe Hernandez





ATTENDING PHYSICIAN STATEMENT





I saw and evaluated the patient.


I reviewed the resident's note and discussed the case with the resident.


I agree with the resident's findings and plan as documented with exceptions 

below.








SUBJECTIVE:


Patient seen and examined. confused, diaphoretic, hallucinating, denies any 

complaints. 





OBJECTIVE:


 Vital Signs











 Period  Temp  Pulse  Resp  BP Sys/Lombardi  Pulse Ox


 


 Last 24 Hr  98.3 F-100.0 F    17-20  115-168/  








 Intake & Output











 10/15/19 10/16/19 10/17/19 10/18/19





 23:59 23:59 23:59 23:59


 


Intake Total   170 15


 


Balance   170 15


 


Weight   189 lb 12.8 oz 








General: lying in bed, confused, diaphoretic, flushed, hallcunating, restless


Neck: soft, supple


Chest: no rales or wheezing


Abdomen:soft, obese, NT


Extremities: tremors


Neuro: awake, confused, oriented to self only, restless, moves all extremities 

freely, speech normal, further exam limited





 Home Medications











 Medication  Instructions  Recorded


 


Metoprolol Succinate 25 mg PO DAILY 02/19/18


 


Aspirin 81 mg PO DAILY 10/17/19


 


Atorvastatin Ca [Lipitor] 20 mg PO DAILY 10/17/19


 


Losartan Potassium 25 mg PO DAILY 10/17/19


 


Spironolactone 25 mg PO DAILY 10/17/19








Active Medications





Lactulose (Cephulac (Oral Use))  20 gm PO TID PRN


   PRN Reason: CONSTIPATION


Lorazepam (Ativan -)  1 mg PO Q4H PRN


   PRN Reason: Symptoms of Withdrawal


   Stop: 10/19/19 23:59


   Last Admin: 10/18/19 08:00 Dose:  1 mg


Lorazepam (Ativan -)  1 mg PO 0500,1100,1700,2300 DEREK


   Stop: 10/18/19 23:01


   Last Admin: 10/18/19 12:25 Dose:  1 mg


Lorazepam (Ativan -)  0.5 mg PO Q4H PRN


   PRN Reason: Symptoms of Withdrawal


   Stop: 10/20/19 23:59


Lorazepam (Ativan -)  0.5 mg PO Q6H DEREK


   Stop: 10/19/19 23:01


Lorazepam (Ativan -)  0.5 mg PO ONCE ONE


   Stop: 10/20/19 05:01


Lorazepam (Ativan Injection -)  2 mg IVPUSH Q4H PRN


   PRN Reason: ANXIETY


   Last Admin: 10/18/19 12:48 Dose:  2 mg


Losartan Potassium (Cozaar -)  25 mg PO DAILY [facility]


   Last Admin: 10/18/19 09:49 Dose:  25 mg


Metoprolol Succinate (Toprol Xl -)  25 mg PO DAILY [facility]


   Last Admin: 10/18/19 09:49 Dose:  25 mg


Potassium Phos/Sodium Phos (Phos-Nak Packet -)  1 packet PO BID [facility]


   Last Admin: 10/18/19 12:25 Dose:  Not Given


Spironolactone (Aldactone -)  25 mg PO DAILY [facility]


   Last Admin: 10/18/19 09:49 Dose:  25 mg





 Laboratory Results - last 24 hr











  10/17/19 10/17/19 10/17/19





  11:58 14:20 14:20


 


WBC   


 


RBC   


 


Hgb   


 


Hct   


 


MCV   


 


MCH   


 


MCHC   


 


RDW   


 


Plt Count   


 


MPV   


 


Absolute Neuts (auto)   


 


Neutrophils %   


 


Lymphocytes %   


 


Monocytes %   


 


Eosinophils %   


 


Basophils %   


 


Nucleated RBC %   


 


PT with INR   


 


INR   


 


Sodium   


 


Potassium   


 


Chloride   


 


Carbon Dioxide   


 


Anion Gap   


 


BUN   


 


Creatinine   


 


Est GFR (CKD-EPI)AfAm   


 


Est GFR (CKD-EPI)NonAf   


 


Random Glucose   


 


Lactic Acid   


 


Calcium   


 


Phosphorus   


 


Magnesium   


 


Total Bilirubin   


 


Direct Bilirubin   


 


GGT   


 


AST   


 


ALT   


 


Alkaline Phosphatase   


 


Ammonia   


 


Creatine Kinase  1298 H  


 


Creatine Kinase Index  0.8  


 


CK-MB (CK-2)  10.7 H  


 


Troponin I  0.06 H  


 


B-Natriuretic Peptide  336.8 H  


 


Total Protein   


 


Albumin   


 


Vitamin B12   


 


Serum Folate   


 


TSH  1.42  D  


 


Acetaminophen    <2.0


 


Alcohol, Quantitative   < 3.0 


 


HIV 1&2 Antibody Screen   


 


HIV P24 Antigen   














  10/17/19 10/17/19 10/17/19





  15:06 15:06 15:06


 


WBC   


 


RBC   


 


Hgb   


 


Hct   


 


MCV   


 


MCH   


 


MCHC   


 


RDW   


 


Plt Count   


 


MPV   


 


Absolute Neuts (auto)   


 


Neutrophils %   


 


Lymphocytes %   


 


Monocytes %   


 


Eosinophils %   


 


Basophils %   


 


Nucleated RBC %   


 


PT with INR   


 


INR   


 


Sodium   


 


Potassium   


 


Chloride   


 


Carbon Dioxide   


 


Anion Gap   


 


BUN   


 


Creatinine   


 


Est GFR (CKD-EPI)AfAm   


 


Est GFR (CKD-EPI)NonAf   


 


Random Glucose   


 


Lactic Acid   


 


Calcium   


 


Phosphorus   


 


Magnesium   


 


Total Bilirubin   


 


Direct Bilirubin   


 


GGT   


 


AST   


 


ALT   


 


Alkaline Phosphatase   


 


Ammonia  37.00 H  


 


Creatine Kinase   


 


Creatine Kinase Index   


 


CK-MB (CK-2)   


 


Troponin I    0.06 H


 


B-Natriuretic Peptide   


 


Total Protein   


 


Albumin   


 


Vitamin B12    1444 H


 


Serum Folate    18 H


 


TSH   


 


Acetaminophen   


 


Alcohol, Quantitative   


 


HIV 1&2 Antibody Screen   Negative 


 


HIV P24 Antigen   Negative 














  10/17/19 10/17/19 10/18/19





  15:06 16:40 03:00


 


WBC   


 


RBC   


 


Hgb   


 


Hct   


 


MCV   


 


MCH   


 


MCHC   


 


RDW   


 


Plt Count   


 


MPV   


 


Absolute Neuts (auto)   


 


Neutrophils %   


 


Lymphocytes %   


 


Monocytes %   


 


Eosinophils %   


 


Basophils %   


 


Nucleated RBC %   


 


PT with INR    15.30 H


 


INR    1.29 H


 


Sodium   


 


Potassium   


 


Chloride   


 


Carbon Dioxide   


 


Anion Gap   


 


BUN   


 


Creatinine   


 


Est GFR (CKD-EPI)AfAm   


 


Est GFR (CKD-EPI)NonAf   


 


Random Glucose   


 


Lactic Acid   1.4 


 


Calcium   


 


Phosphorus   


 


Magnesium   


 


Total Bilirubin   


 


Direct Bilirubin  1.1 H  


 


GGT   


 


AST   


 


ALT   


 


Alkaline Phosphatase   


 


Ammonia   


 


Creatine Kinase   


 


Creatine Kinase Index   


 


CK-MB (CK-2)   


 


Troponin I   


 


B-Natriuretic Peptide   


 


Total Protein   


 


Albumin   


 


Vitamin B12   


 


Serum Folate   


 


TSH   


 


Acetaminophen   


 


Alcohol, Quantitative   


 


HIV 1&2 Antibody Screen   


 


HIV P24 Antigen   














  10/18/19 10/18/19 10/18/19





  03:00 06:15 06:15


 


WBC   5.0 


 


RBC   3.92 L 


 


Hgb   13.1 


 


Hct   37.9 


 


MCV   96.9 H 


 


MCH   33.5 


 


MCHC   34.6 


 


RDW   14.0 


 


Plt Count   58 L 


 


MPV   8.9 


 


Absolute Neuts (auto)   3.3 


 


Neutrophils %   66.3 


 


Lymphocytes %   15.7 


 


Monocytes %   16.7 H 


 


Eosinophils %   0.3 


 


Basophils %   1.0 


 


Nucleated RBC %   0 


 


PT with INR   


 


INR   


 


Sodium    137


 


Potassium    3.4 L


 


Chloride    105


 


Carbon Dioxide    22


 


Anion Gap    10


 


BUN    13.4


 


Creatinine    0.8


 


Est GFR (CKD-EPI)AfAm    118.20


 


Est GFR (CKD-EPI)NonAf    101.99


 


Random Glucose    101


 


Lactic Acid   


 


Calcium    8.4 L


 


Phosphorus    2.2 L


 


Magnesium    1.5 L


 


Total Bilirubin    2.7 H


 


Direct Bilirubin   


 


GGT    635 H


 


AST    117 H


 


ALT    35


 


Alkaline Phosphatase    84


 


Ammonia   


 


Creatine Kinase   


 


Creatine Kinase Index   


 


CK-MB (CK-2)   


 


Troponin I  0.04  


 


B-Natriuretic Peptide   


 


Total Protein    8.4 H


 


Albumin    3.0 L


 


Vitamin B12   


 


Serum Folate   


 


TSH    1.75  D


 


Acetaminophen   


 


Alcohol, Quantitative   


 


HIV 1&2 Antibody Screen   


 


HIV P24 Antigen   














  10/18/19





  06:15


 


WBC 


 


RBC 


 


Hgb 


 


Hct 


 


MCV 


 


MCH 


 


MCHC 


 


RDW 


 


Plt Count 


 


MPV 


 


Absolute Neuts (auto) 


 


Neutrophils % 


 


Lymphocytes % 


 


Monocytes % 


 


Eosinophils % 


 


Basophils % 


 


Nucleated RBC % 


 


PT with INR  15.40 H


 


INR  1.30 H


 


Sodium 


 


Potassium 


 


Chloride 


 


Carbon Dioxide 


 


Anion Gap 


 


BUN 


 


Creatinine 


 


Est GFR (CKD-EPI)AfAm 


 


Est GFR (CKD-EPI)NonAf 


 


Random Glucose 


 


Lactic Acid 


 


Calcium 


 


Phosphorus 


 


Magnesium 


 


Total Bilirubin 


 


Direct Bilirubin 


 


GGT 


 


AST 


 


ALT 


 


Alkaline Phosphatase 


 


Ammonia 


 


Creatine Kinase 


 


Creatine Kinase Index 


 


CK-MB (CK-2) 


 


Troponin I 


 


B-Natriuretic Peptide 


 


Total Protein 


 


Albumin 


 


Vitamin B12 


 


Serum Folate 


 


TSH 


 


Acetaminophen 


 


Alcohol, Quantitative 


 


HIV 1&2 Antibody Screen 


 


HIV P24 Antigen 








 Microbiology





10/17/19 12:05   Urine - Urine Clean Catch   Urine Culture - Final


                            NO GROWTH OBTAINED











ASSESSMENT AND PLAN:


54 yom with PMhx of HTN, HLD, bicuspid AV with critical AS s/p bioprosthetic 

SAVR, short IABP support and dual chamber pacemaker implantation for CHB 02/27/ 2018, ETOH use admitted with hallucinations, unsteady gait, concerning for ETOH 

withdrawal. 





-Deliriuim Tremens, +/- hepatic encephalopathy/Korsakoff's psychosis/Wernicke's 

encephalopathy


-Abnormal LFTs/Mixed hyperbilirubinemia, suspect from above


-Elevated troponin, suspect demand mediated from above


-Cirrhosis,likely alcoholic, with portal htn/splenomegaly/Thrombocytopenia, 

suspect varices


-Acute on chronic thrombocytopenia, suspect From alcohol related bone marrow 

suppression over underlying cirrhosis/splenomegaly


-bicuspid AV with critical AS s/p bioprosthetic SAVR, short IABP support and 

dual chamber pacemaker implantation for CHB 02/27/2018


-HTN


-HLD





Plan:


Ativan detox protocol, IV Prn,


1:1 observation 


Detox consult. 


Folate/thiamine,  gentle hydration with monitoring of volume status. 


GGT noted, LFTs trending down.GI input noted. 


PPI daily, will need outpatient hepatology follow up


lactulose TID. 


Hep panel, if neg, Hep B vaccination. 


Neurology input noted. 


Folate/b12 levels noted. 


Trop flat. ASA with close monitoring of platelets. Cardiology input noted. 


Patient not compliant with his meds.  Continue toprol/spironolactone/losartan. 

Hold statin till LFts stabilize


Follow up repeat 2D echo. 


DVTPPX SCDs


PT eval when improved.


Dispo pending clinical improvement.


May benefit from ETOH rehab once improved.

## 2019-10-18 NOTE — EKG
Test Reason : 

Blood Pressure : ***/*** mmHG

Vent. Rate : 092 BPM     Atrial Rate : 092 BPM

   P-R Int : 192 ms          QRS Dur : 162 ms

    QT Int : 434 ms       P-R-T Axes : 028 -15 009 degrees

   QTc Int : 536 ms

 

NORMAL SINUS RHYTHM

RIGHT BUNDLE BRANCH BLOCK

ABNORMAL ECG

WHEN COMPARED WITH ECG OF 20-FEB-2018 20:35,

RIGHT BUNDLE BRANCH BLOCK IS NOW PRESENT

Confirmed by DOUG MADISON MD ([id number]) on 10/18/2019 12:50:26 PM

 

Referred By:             Confirmed By:DOUG MADISON MD

## 2019-10-18 NOTE — CON.GI
Consult


Consult Specialty:: GI


Referred by:: Hospitalist Service


Reason for Consultation:: Hepatic Encephalopathy





- History of Present Illness


Chief Complaint: Patient confused, agitated. does not give history


History of Present Illness: 





54M evaluated in ER for evaluation of aletred mental status. Per the H& P he 

was having hallucinations x 2 days. It also states that hehe only drinks 2-3 

beers a week. Asked to evaluate for hepatic encephalopathy.  patient currently 

restrained.  security wa there helping to restrain him as he was extremely 

agitated. Unable to obtain history. CT scan of the abdomen and pelvis revealed 

an enlarged liver and changes consistent with portal hypertension.  His MCV is 

elevated and his platelet count is low.  He has a coagulopathy. Unclear when 

his last drink was.  CPK elevated on admission


  





- History Source


History Provided By: Medical Record





- Past Medical History


Cardio/Vascular: Yes: Aortic Stenosis, HTN, Hyperlipdemia


Hepatobiliary: Yes: Cirrhosis





- Past Surgical History


Past Surgical History: Yes: Permanent Pacemaker ([employer])





- Alcohol/Substance Use


Hx Alcohol Use: Yes





- Smoking History


Smoking history: Never smoked


Have you smoked in the past 12 months: No





- Social History


Usual Living Arrangement: With Child


ADL: Independent





Home Medications





- Allergies


Allergies/Adverse Reactions: 


 Allergies











Allergy/AdvReac Type Severity Reaction Status Date / Time


 


No Known Allergies Allergy   Verified 02/19/18 13:22














- Home Medications


Home Medications: 


Ambulatory Orders





Metoprolol Succinate 25 mg PO DAILY 02/19/18 


Aspirin 81 mg PO DAILY 10/17/19 


Atorvastatin Ca [Lipitor] 20 mg PO DAILY 10/17/19 


Losartan Potassium 25 mg PO DAILY 10/17/19 


Spironolactone 25 mg PO DAILY 10/17/19 











Family Medical History


Family History: Unable to Obtain





Review of Systems





- Review of Systems


Gastrointestinal: denies: Abdominal Pain





Physical Exam-GI


Vital Signs: 


 Vital Signs











Temperature  99.1 F   10/18/19 09:41


 


Pulse Rate  123 H  10/18/19 09:41


 


Respiratory Rate  20   10/18/19 09:41


 


Blood Pressure  168/107 H  10/18/19 09:41


 


O2 Sat by Pulse Oximetry (%)  96   10/17/19 20:50











Constitutional: Yes: Anxious, Diaphoresis


Eyes: No: Sclera Icterus


Cardiovascular: Yes: Tachycardia


Respiratory: Yes: Diminished (at bases but uncooperative)


Gastrointestinal Inspection: Yes: Scars (sternotomy scar extending to upper 

abdomen).  No: Distention


...Auscultate: Yes: Normoactive Bowel Sounds


...Percussion: No: Tympanitic


Edema: No (No LE edema)


Neurological: Yes: Alert, Confusion


Labs: 


 CBC, BMP





 10/18/19 06:15 





 10/18/19 06:15 





 INR, PTT











INR  1.30  (0.83-1.09)  H  10/18/19  06:15    








 Hepatic Panel











Total Bilirubin  2.7 mg/dL (0.2-1)  H  10/18/19  06:15    


 


Direct Bilirubin  1.1 mg/dL (0.0-0.2)  H  10/17/19  15:06    


 


AST  117 U/L (15-37)  H  10/18/19  06:15    


 


ALT  35 U/L (13-61)   10/18/19  06:15    


 


Alkaline Phosphatase  84 U/L ()   10/18/19  06:15    


 


Albumin  3.0 g/dl (3.4-5.0)  L  10/18/19  06:15    














Problem List





- Problems


(1) Altered mental status


Assessment/Plan: 


Suspect that alcohol withdrawal with delerium tremens is the main concern at 

this point. Patient extremely agitated and diaphoretic. Elevated  CPK can go 

along with the DT's.  Hepatic encephalopathy usually progresses to lethargy and 

coma 


Advise: 


Treatment of DT's / exclusion of alternate causes of agitation per primary team


Psych eval


Detox eval


Lactulose 30mg daily


Needs complete alcohol abstinence


An acute hepatitis panel was checked but he likely has chronic liver disease 

from alcohol abuse.  Can check hepatitis B surface antibody.  If negative, 

should be offered hepatitis B vaccination. 


Given his complicating comorbidities, for further evaluation of his liver 

disease, he should be referred to a liver center as outpatient when his acute 

issues are resolved 


Continue to monitor CPK and treatment of Rhabdo per primary team





Code(s): R41.82 - ALTERED MENTAL STATUS, UNSPECIFIED   


Qualifiers: 


   Altered mental status type: delirium   Qualified Code(s): R41.0 - 

Disorientation, unspecified

## 2019-10-18 NOTE — PN
Physical Exam: 


SUBJECTIVE: Patient seen and examined


O/N: received Ativan 1mg x2 for agitation, getting out of bed w/ unstable gait, 

pulling off tele monitor


Denies chest pain, hallucinations, HA, palpitations, SOB








OBJECTIVE:





 Vital Signs











 Period  Temp  Pulse  Resp  BP Sys/Lombardi  Pulse Ox


 


 Last 24 Hr  98.3 F-99.1 F    20-20  130-168/  96-98











CIWA 16(sweating, agitated, visible tremors)


GENERAL: The patient is awake, alert to name. Appears agitated


HEAD: NC. Visible beads of sweat.


EYES: mild scleral icterus, conjunctiva clear.  


ENT: Ears normal, nares patent, moist mucous membranes.


NECK: Trachea midline, full range of motion, supple. 


LUNGS: Breath sounds equal, clear to auscultation bilaterally, no wheezes, no 

crackles, no accessory muscle use. 


HEART: rapid heart rapid, S1, S2 without murmur, rub or gallop. Palpable Left 

chest PPM. 


ABDOMEN: Soft, nontender, nondistended, no guarding, no rebound.


EXTREMITIES: 2+ pulses, warm, well-perfused, no edema. 


NEUROLOGICAL: oriented to name. Confused about reason for admission


SKIN: beads of sweat on forehead














 Laboratory Results - last 24 hr











  10/17/19 10/17/19 10/17/19





  15:06 15:06 15:06


 


WBC   


 


RBC   


 


Hgb   


 


Hct   


 


MCV   


 


MCH   


 


MCHC   


 


RDW   


 


Plt Count   


 


MPV   


 


Absolute Neuts (auto)   


 


Neutrophils %   


 


Lymphocytes %   


 


Monocytes %   


 


Eosinophils %   


 


Basophils %   


 


Nucleated RBC %   


 


PT with INR   


 


INR   


 


Sodium   


 


Potassium   


 


Chloride   


 


Carbon Dioxide   


 


Anion Gap   


 


BUN   


 


Creatinine   


 


Est GFR (CKD-EPI)AfAm   


 


Est GFR (CKD-EPI)NonAf   


 


Random Glucose   


 


Lactic Acid   


 


Calcium   


 


Phosphorus   


 


Magnesium   


 


Total Bilirubin   


 


Direct Bilirubin    1.1 H


 


GGT   


 


AST   


 


ALT   


 


Alkaline Phosphatase   


 


Troponin I   0.06 H 


 


Total Protein   


 


Albumin   


 


Vitamin B12   1444 H 


 


Serum Folate   18 H 


 


TSH   


 


HIV 1&2 Antibody Screen  Negative  


 


HIV P24 Antigen  Negative  














  10/17/19 10/18/19 10/18/19





  16:40 03:00 03:00


 


WBC   


 


RBC   


 


Hgb   


 


Hct   


 


MCV   


 


MCH   


 


MCHC   


 


RDW   


 


Plt Count   


 


MPV   


 


Absolute Neuts (auto)   


 


Neutrophils %   


 


Lymphocytes %   


 


Monocytes %   


 


Eosinophils %   


 


Basophils %   


 


Nucleated RBC %   


 


PT with INR   15.30 H 


 


INR   1.29 H 


 


Sodium   


 


Potassium   


 


Chloride   


 


Carbon Dioxide   


 


Anion Gap   


 


BUN   


 


Creatinine   


 


Est GFR (CKD-EPI)AfAm   


 


Est GFR (CKD-EPI)NonAf   


 


Random Glucose   


 


Lactic Acid  1.4  


 


Calcium   


 


Phosphorus   


 


Magnesium   


 


Total Bilirubin   


 


Direct Bilirubin   


 


GGT   


 


AST   


 


ALT   


 


Alkaline Phosphatase   


 


Troponin I    0.04


 


Total Protein   


 


Albumin   


 


Vitamin B12   


 


Serum Folate   


 


TSH   


 


HIV 1&2 Antibody Screen   


 


HIV P24 Antigen   














  10/18/19 10/18/19 10/18/19





  06:15 06:15 06:15


 


WBC  5.0  


 


RBC  3.92 L  


 


Hgb  13.1  


 


Hct  37.9  


 


MCV  96.9 H  


 


MCH  33.5  


 


MCHC  34.6  


 


RDW  14.0  


 


Plt Count  58 L  


 


MPV  8.9  


 


Absolute Neuts (auto)  3.3  


 


Neutrophils %  66.3  


 


Lymphocytes %  15.7  


 


Monocytes %  16.7 H  


 


Eosinophils %  0.3  


 


Basophils %  1.0  


 


Nucleated RBC %  0  


 


PT with INR    15.40 H


 


INR    1.30 H


 


Sodium   137 


 


Potassium   3.4 L 


 


Chloride   105 


 


Carbon Dioxide   22 


 


Anion Gap   10 


 


BUN   13.4 


 


Creatinine   0.8 


 


Est GFR (CKD-EPI)AfAm   118.20 


 


Est GFR (CKD-EPI)NonAf   101.99 


 


Random Glucose   101 


 


Lactic Acid   


 


Calcium   8.4 L 


 


Phosphorus   2.2 L 


 


Magnesium   1.5 L 


 


Total Bilirubin   2.7 H 


 


Direct Bilirubin   


 


GGT   635 H 


 


AST   117 H 


 


ALT   35 


 


Alkaline Phosphatase   84 


 


Troponin I   


 


Total Protein   8.4 H 


 


Albumin   3.0 L 


 


Vitamin B12   


 


Serum Folate   


 


TSH   1.75  D 


 


HIV 1&2 Antibody Screen   


 


HIV P24 Antigen   








Active Medications











Generic Name Dose Route Start Last Admin





  Trade Name Freq  PRN Reason Stop Dose Admin


 


Potassium Phosphate 25 mm/  508.3333 mls @ 62.5 mls/hr  10/18/19 14:29  





  Dextrose  IVPB  10/18/19 22:36  





  ONCE ONE   





     





     





     





     


 


Lactulose  20 gm  10/18/19 07:51  





  Cephulac (Oral Use)  PO   





  TID PRN   





  CONSTIPATION   





     





     





     


 


Lorazepam  1 mg  10/17/19 18:25  10/18/19 08:00





  Ativan -  PO  10/19/19 23:59  1 mg





  Q4H PRN   Administration





  Symptoms of Withdrawal   





     





     





     


 


Lorazepam  1 mg  10/18/19 09:28  10/18/19 16:32





  Ativan -  PO  10/18/19 23:01  1 mg





  0500,1100,1700,2300 DEREK   Administration





     





     





     





     


 


Lorazepam  0.5 mg  10/19/19 00:00  





  Ativan -  PO  10/20/19 23:59  





  Q4H PRN   





  Symptoms of Withdrawal   





     





     





     


 


Lorazepam  0.5 mg  10/19/19 05:00  





  Ativan -  PO  10/19/19 23:01  





  Q6H DEREK   





     





     





     





     


 


Lorazepam  0.5 mg  10/20/19 05:00  





  Ativan -  PO  10/20/19 05:01  





  ONCE ONE   





     





     





     





     


 


Lorazepam  2 mg  10/18/19 12:09  10/18/19 16:34





  Ativan Injection -  IVPUSH   2 mg





  Q4H PRN   Administration





  ANXIETY   





     





     





     


 


Losartan Potassium  25 mg  10/18/19 10:00  10/18/19 09:49





  Cozaar -  PO   25 mg





  DAILY DEREK   Administration





     





     





     





     


 


Metoprolol Succinate  25 mg  10/18/19 10:00  10/18/19 09:49





  Toprol Xl -  PO   25 mg





  DAILY DEREK   Administration





     





     





     





     


 


Spironolactone  25 mg  10/18/19 10:00  10/18/19 09:49





  Aldactone -  PO   25 mg





  DAILY DEREK   Administration





     





     





     





     











ASSESSMENT/PLAN:


Patient is a 53 y/o male with a history of HTN, HLD, and HFrER with pacemaker 

who presents for 2 days of CP and visual hallucinations.





#AMS -- likely 2/2 to EtOH withdrawal 


> CIWA 16


> CT A/P(10/17/19): liver 17.2cm, cirrhosis, splenomegaly, possible esophageal 

varices


> CT H(10/17/19): neg


> B12 ~1444


> Folate ~18


- Ativan protocol


- Neuro Consult


   --thinking likely hepatic encephalopathy


   --librium taper/B1/B12/folate supplementation 


- GI Consult


   --lactulose 30mg daily


   --hepatitis panel -- pending





#Cirrhosis 


> Nabor B


> Tbil 2.7, INR 1.3, Alb 3.0


> ammonia level ~ 37


- lactulose TID


- spironlactone





#tropinemia


> troponin 0.06->0.07->0.06->0.04


- monitor patient on tele


- continuous cardiac monitoring


- f/u repeat echo -- pending





#thrombocytopenia


- holding DVT ppx and ASA


- trend daily Plt





#HLD


- continue atorvastatin





#HTN


-continue metoprolol and losartan





#Aortic Valve Replacement(Magna 25)


#Complete Heart Block


- continue spironolactone


- per patient has a dual chamber pacemaker


- Cardio Consult:


   --trend trops


   --fu rpt echo


   --cw Toprol XL 25 qd, losartan 25 qd, aldactone 25 qd, ASA 81





DVT ppx


> venous duplex(10/18/19): neg


- SCD's





FEN


- sodium controlled diet





Dispo: monitor on tele








Visit type





- Emergency Visit


Emergency Visit: No





- New Patient


This patient is new to me today: No





- Critical Care


Critical Care patient: No





ATTENDING PHYSICIAN STATEMENT





I saw and evaluated the patient.


I reviewed the resident's note and discussed the case with the resident.


I agree with the resident's findings and plan as documented.








SUBJECTIVE:








OBJECTIVE:








ASSESSMENT AND PLAN:

## 2019-10-19 LAB
ALBUMIN SERPL-MCNC: 2.9 G/DL (ref 3.4–5)
ALP SERPL-CCNC: 80 U/L (ref 45–117)
ALT SERPL-CCNC: 34 U/L (ref 13–61)
ANION GAP SERPL CALC-SCNC: 8 MMOL/L (ref 8–16)
AST SERPL-CCNC: 94 U/L (ref 15–37)
BILIRUB SERPL-MCNC: 2 MG/DL (ref 0.2–1)
BUN SERPL-MCNC: 8.9 MG/DL (ref 7–18)
CALCIUM SERPL-MCNC: 8.7 MG/DL (ref 8.5–10.1)
CHLORIDE SERPL-SCNC: 105 MMOL/L (ref 98–107)
CO2 SERPL-SCNC: 25 MMOL/L (ref 21–32)
CREAT SERPL-MCNC: 0.8 MG/DL (ref 0.55–1.3)
DEPRECATED RDW RBC AUTO: 14.4 % (ref 11.9–15.9)
GLUCOSE SERPL-MCNC: 99 MG/DL (ref 74–106)
HCT VFR BLD CALC: 39.3 % (ref 35.4–49)
HGB BLD-MCNC: 13.4 GM/DL (ref 11.7–16.9)
INR BLD: 1.36 (ref 0.83–1.09)
MCH RBC QN AUTO: 33.5 PG (ref 25.7–33.7)
MCHC RBC AUTO-ENTMCNC: 34.1 G/DL (ref 32–35.9)
MCV RBC: 98 FL (ref 80–96)
PLATELET # BLD AUTO: 59 K/MM3 (ref 134–434)
PMV BLD: 8.9 FL (ref 7.5–11.1)
POTASSIUM SERPLBLD-SCNC: 4 MMOL/L (ref 3.5–5.1)
PROT SERPL-MCNC: 8.3 G/DL (ref 6.4–8.2)
PT PNL PPP: 16.1 SEC (ref 9.7–13)
RBC # BLD AUTO: 4.01 M/MM3 (ref 4–5.6)
SODIUM SERPL-SCNC: 137 MMOL/L (ref 136–145)
WBC # BLD AUTO: 3.9 K/MM3 (ref 4–10)

## 2019-10-19 RX ADMIN — SPIRONOLACTONE SCH: 25 TABLET, FILM COATED ORAL at 22:23

## 2019-10-19 RX ADMIN — LORAZEPAM PRN MG: 2 INJECTION INTRAMUSCULAR; INTRAVENOUS at 22:29

## 2019-10-19 RX ADMIN — LORAZEPAM PRN MG: 2 INJECTION INTRAMUSCULAR; INTRAVENOUS at 00:23

## 2019-10-19 RX ADMIN — LACTULOSE SCH GM: 20 SOLUTION ORAL at 06:02

## 2019-10-19 RX ADMIN — LOSARTAN POTASSIUM SCH MG: 25 TABLET, FILM COATED ORAL at 09:32

## 2019-10-19 RX ADMIN — LACTULOSE SCH GM: 20 SOLUTION ORAL at 13:39

## 2019-10-19 RX ADMIN — LACTULOSE SCH: 20 SOLUTION ORAL at 22:23

## 2019-10-19 RX ADMIN — SPIRONOLACTONE SCH MG: 25 TABLET, FILM COATED ORAL at 20:33

## 2019-10-19 RX ADMIN — LACTULOSE SCH GM: 20 SOLUTION ORAL at 20:33

## 2019-10-19 RX ADMIN — SPIRONOLACTONE SCH MG: 25 TABLET, FILM COATED ORAL at 09:33

## 2019-10-19 NOTE — PN
Progress Note, Physician


History of Present Illness: 





Patient seen and examined at bedside


Still having alcohol withdrawal symptoms but no longer hallucinating or 

delirious


AAOx3





- Current Medication List


Current Medications: 


Active Medications





Lactulose (Cephulac (Oral Use))  20 gm PO TID [facility]


   Last Admin: 10/19/19 06:02 Dose:  20 gm


Lorazepam (Ativan -)  1 mg PO Q4H PRN


   PRN Reason: Symptoms of Withdrawal


   Stop: 10/19/19 23:59


   Last Admin: 10/18/19 08:00 Dose:  1 mg


Lorazepam (Ativan -)  0.5 mg PO Q4H PRN


   PRN Reason: Symptoms of Withdrawal


   Stop: 10/20/19 23:59


Lorazepam (Ativan -)  0.5 mg PO Q6H DEREK


   Stop: 10/19/19 23:01


   Last Admin: 10/19/19 12:49 Dose:  0.5 mg


Lorazepam (Ativan -)  0.5 mg PO ONCE ONE


   Stop: 10/20/19 05:01


Lorazepam (Ativan Injection -)  2 mg IVPUSH Q2H PRN


   PRN Reason: ANXIETY


   Last Admin: 10/19/19 00:23 Dose:  2 mg


Losartan Potassium (Cozaar -)  25 mg PO DAILY [facility]


   Last Admin: 10/19/19 09:32 Dose:  25 mg


Nadolol (Corgard -)  40 mg PO DAILY [facility]


Spironolactone (Aldactone -)  25 mg PO BID [facility]











- Objective


Vital Signs: 


 Vital Signs











Temperature  98 F   10/19/19 10:00


 


Pulse Rate  78   10/19/19 10:00


 


Respiratory Rate  18   10/19/19 10:00


 


Blood Pressure  125/93   10/19/19 10:00


 


O2 Sat by Pulse Oximetry (%)  93 L  10/19/19 09:00











Constitutional: Yes: Well Nourished, Other (tremulous)


Eyes: Yes: Sclera Icterus


HENT: Yes: Atraumatic


Neck: Yes: Supple


Cardiovascular: Yes: Regular Rate and Rhythm


Respiratory: Yes: CTA Bilaterally


Gastrointestinal: Yes: Soft.  No: Distention, Tenderness


Edema: LLE: Trace, RLE: Trace


Neurological: Yes: Alert, Oriented, Other (tremors)


Labs: 


 CBC, BMP





 10/19/19 05:13 





 10/19/19 05:13 





 INR, PTT











INR  1.36  (0.83-1.09)  H  10/19/19  05:13    














- ....Imaging


Ultrasound: Report Reviewed (no DVT)





Impression/Plan


Impression/Plan: 


54M with multiple medical problems including HTN HLD bicuspid aortic valve, 

critical aortic stenosis s/p bioprosthetic SAVR, short IABP support and dual 

chamber pacemaker implantation for [facility] 02/27/2018, ETOH use admitted with 

hallucinations and unsteady gait consistent with alcohol withdrawal





Patient no longer delirious but still having whole body tremors 


Deliriuim Tremens, +/- hepatic encephalopathy/Korsakoff's psychosis/Wernicke's 

encephalopathy


Abnormal LFTs/Mixed hyperbilirubinemia, suspect from above


Elevated troponin, suspect demand mediated from above


Cirrhosis,likely alcoholic, with portal htn/splenomegaly/Thrombocytopenia, 

suspect varices


Acute on chronic thrombocytopenia, suspect From alcohol related bone marrow 

suppression over underlying cirrhosis/splenomegaly


bicuspid AV with critical AS s/p bioprosthetic SAVR, short IABP support and 

dual chamber pacemaker implantation for [facility] 02/27/2018


HTN


HLD


rhabdomyolysis 





Plan:


contiue CIWA protocol for alcohol withdrawal 


patient seems to be improving


will stop 1:1 sitter


Detox consult. 


Folate/thiamine 


GGT noted, LFTs trending down.


GI consult noted and appreciated


will get Hep B surface Ab-this was not part of hepatitis panel. if negative 

will give first dose of vaccine


PPI for GI PPx


SCDs for DVT PPx-US duplex negative for DVT 


 will need outpatient hepatology follow up upon discharge


continue lactulose TID. 


Neurology consult noted and appreciated


Folate/b12 levels high


agree with holding aspirin


agree with stopping metoprolol and starting nadolol


Continue spironolactone and losartan 


PT consult when able to participate 


Hold statin till LFts stabilize


add on CPK to this mornings labs as it has not been checked since admission-

improved


continue to trend CPK














Visit type





- Emergency Visit


Emergency Visit: Yes


ED Registration Date: 10/17/19


Care time: The patient presented to the Emergency Department on the above date 

and was hospitalized for further evaluation of their emergent condition.





- New Patient


This patient is new to me today: Yes


Date on this admission: 10/19/19





- Critical Care


Critical Care patient: No

## 2019-10-19 NOTE — PN
Progress Note (short form)





- Note


Progress Note: 


Chief Complaint: Event noted, notes reviewed, lethargic but easily arousable, 

in no distress 





History of Present Illness: 


Seen and examined on telemetry. Event noted, notes reviewed, lethargic but 

easily arousable, in no distress 





- Current Medication List





 


 


 Current Medications





Lactulose (Cephulac (Oral Use))  20 gm PO TID [facility]


   Last Admin: 10/19/19 06:02 Dose:  20 gm


Lorazepam (Ativan -)  1 mg PO Q4H PRN


   PRN Reason: Symptoms of Withdrawal


   Stop: 10/19/19 23:59


   Last Admin: 10/18/19 08:00 Dose:  1 mg


Lorazepam (Ativan -)  0.5 mg PO Q4H PRN


   PRN Reason: Symptoms of Withdrawal


   Stop: 10/20/19 23:59


Lorazepam (Ativan -)  0.5 mg PO Q6H DEREK


   Stop: 10/19/19 23:01


   Last Admin: 10/19/19 06:02 Dose:  0.5 mg


Lorazepam (Ativan -)  0.5 mg PO ONCE ONE


   Stop: 10/20/19 05:01


Lorazepam (Ativan Injection -)  2 mg IVPUSH Q2H PRN


   PRN Reason: ANXIETY


   Last Admin: 10/19/19 00:23 Dose:  2 mg


Losartan Potassium (Cozaar -)  25 mg PO DAILY [facility]


   Last Admin: 10/18/19 09:49 Dose:  25 mg


Metoprolol Succinate (Toprol Xl -)  25 mg PO DAILY [facility]


   Last Admin: 10/18/19 09:49 Dose:  25 mg


Spironolactone (Aldactone -)  25 mg PO DAILY [facility]


   Last Admin: 10/18/19 09:49 Dose:  25 mg





Review of Systems





Unable to obtain





- Objective


Vital Signs: 





 


 Last Vital Signs











Temp Pulse Resp BP Pulse Ox


 


 98.9 F   89   20   132/91   94 L


 


 10/19/19 05:37  10/19/19 05:37  10/19/19 05:37  10/19/19 05:37  10/18/19 21:00








 Intake & Output











 10/16/19 10/17/19 10/18/19 10/19/19





 23:59 23:59 23:59 23:59


 


Intake Total  170 535 


 


Balance  170 535 


 


Weight  189 lb 12.8 oz  











Constitutional: No Distress, Calm


Neck: Supple Negative JVD No Bruit


Respiratory: Diminished Breath Sounds at the Bases 


Cardiovascular: S1 S2 Regular Rate Rhythm 


Gastrointestinal: Soft Benign Normal Bowel Sounds


Ext: Trace Edema





Labs: 





 


 CBC, BMP





 10/19/19 05:13 





 10/19/19 05:13 





 Hepatic Panel











Total Bilirubin  2.0 mg/dL (0.2-1)  H  10/19/19  05:13    


 


Direct Bilirubin  1.1 mg/dL (0.0-0.2)  H  10/17/19  15:06    


 


AST  94 U/L (15-37)  H  10/19/19  05:13    


 


ALT  34 U/L (13-61)   10/19/19  05:13    


 


Alkaline Phosphatase  80 U/L ()   10/19/19  05:13    


 


Albumin  2.9 g/dl (3.4-5.0)  L  10/19/19  05:13    








 INR, PTT











INR  1.36  (0.83-1.09)  H  10/19/19  05:13    











ASSESSMENT:





1. Altered mental status, hepatic encephalopathy/wernicke's encephlopathy/

korsakoff psychosis, resolving


2. History of critical AS due to bicuspid aortic valve post SAVR/Bioprosthetic 

aortic valve 


3. History of dilated non ischemic cardiomyopathy related to the above noted 

valvular pathology resolved with evidence of demand ischemic injury/

subendocardial ischemia


4. Complete heart block post dual chamber pacemaker


5. Hypertensive cardiovascular disease


6. Pericardial effusion


7. Hyperlipidemia


8. Thrombocytopenia/Neutropenia


9. Acute ETOH hepatitis with evidence of chronic liver disease


10. Portal hypertension related to above





PLAN:





1. Continue Cozaar


2. Start Nadolol in substitution for Toprol XL/in view of the above noted 

portal hypertension


3. Continue Aldactone and titrate dosage 


4. Avoid ASA for now pending improvement in the above noted thrombocytopenia


5. Repeat echocardiography study for evaluation of LV function, valvular 

function and pericardial effusion














Ruthy Barragan M.D.

## 2019-10-20 LAB
ALBUMIN SERPL-MCNC: 2.8 G/DL (ref 3.4–5)
ALP SERPL-CCNC: 75 U/L (ref 45–117)
ALT SERPL-CCNC: 27 U/L (ref 13–61)
ANION GAP SERPL CALC-SCNC: 9 MMOL/L (ref 8–16)
AST SERPL-CCNC: 66 U/L (ref 15–37)
BASOPHILS # BLD: 0.8 % (ref 0–2)
BILIRUB CONJ SERPL-MCNC: 1.1 MG/DL (ref 0–0.2)
BILIRUB SERPL-MCNC: 2.4 MG/DL (ref 0.2–1)
BUN SERPL-MCNC: 12.6 MG/DL (ref 7–18)
CALCIUM SERPL-MCNC: 8.5 MG/DL (ref 8.5–10.1)
CHLORIDE SERPL-SCNC: 103 MMOL/L (ref 98–107)
CO2 SERPL-SCNC: 23 MMOL/L (ref 21–32)
CREAT SERPL-MCNC: 0.9 MG/DL (ref 0.55–1.3)
DEPRECATED RDW RBC AUTO: 14.5 % (ref 11.9–15.9)
EOSINOPHIL # BLD: 0.6 % (ref 0–4.5)
GLUCOSE SERPL-MCNC: 96 MG/DL (ref 74–106)
HCT VFR BLD CALC: 39.2 % (ref 35.4–49)
HGB BLD-MCNC: 13.4 GM/DL (ref 11.7–16.9)
INR BLD: 1.4 (ref 0.83–1.09)
LYMPHOCYTES # BLD: 21.7 % (ref 8–40)
MAGNESIUM SERPL-MCNC: 1.7 MG/DL (ref 1.8–2.4)
MCH RBC QN AUTO: 33.7 PG (ref 25.7–33.7)
MCHC RBC AUTO-ENTMCNC: 34.3 G/DL (ref 32–35.9)
MCV RBC: 98.3 FL (ref 80–96)
MONOCYTES # BLD AUTO: 22.4 % (ref 3.8–10.2)
NEUTROPHILS # BLD: 54.5 % (ref 42.8–82.8)
PLATELET BLD QL SMEAR: (no result)
PMV BLD: 9.3 FL (ref 7.5–11.1)
POTASSIUM SERPLBLD-SCNC: 3.8 MMOL/L (ref 3.5–5.1)
PROT SERPL-MCNC: 8.1 G/DL (ref 6.4–8.2)
PT PNL PPP: 16.6 SEC (ref 9.7–13)
RBC # BLD AUTO: 3.98 M/MM3 (ref 4–5.6)
SODIUM SERPL-SCNC: 135 MMOL/L (ref 136–145)
WBC # BLD AUTO: 5.3 K/MM3 (ref 4–10)

## 2019-10-20 RX ADMIN — LOSARTAN POTASSIUM SCH MG: 25 TABLET, FILM COATED ORAL at 09:22

## 2019-10-20 RX ADMIN — NADOLOL SCH MG: 40 TABLET ORAL at 09:22

## 2019-10-20 RX ADMIN — MAGNESIUM OXIDE TAB 400 MG (241.3 MG ELEMENTAL MG) SCH MG: 400 (241.3 MG) TAB at 09:22

## 2019-10-20 RX ADMIN — MAGNESIUM OXIDE TAB 400 MG (241.3 MG ELEMENTAL MG) SCH MG: 400 (241.3 MG) TAB at 21:27

## 2019-10-20 RX ADMIN — LACTULOSE SCH GM: 20 SOLUTION ORAL at 21:27

## 2019-10-20 RX ADMIN — LACTULOSE SCH GM: 20 SOLUTION ORAL at 06:02

## 2019-10-20 RX ADMIN — LACTULOSE SCH GM: 20 SOLUTION ORAL at 14:26

## 2019-10-20 RX ADMIN — SPIRONOLACTONE SCH MG: 25 TABLET, FILM COATED ORAL at 21:27

## 2019-10-20 RX ADMIN — SPIRONOLACTONE SCH MG: 25 TABLET, FILM COATED ORAL at 09:22

## 2019-10-20 NOTE — PN
Teaching Attending Note


Name of Resident: Olivia Cardozo





ATTENDING PHYSICIAN STATEMENT





I saw and evaluated the patient.


I reviewed the resident's note and discussed the case with the resident.


I agree with the resident's findings and plan as documented.








SUBJECTIVE:


Patient seen and examined at bedside


denies nausea vomiting fever chills chest pain or SOB


LFTs improving but bilirubin increased


less tremulous today








OBJECTIVE:





Constitutional: Yes: Well Nourished, Other (tremulous)


Eyes: EOMI


HENT: Yes: Atraumatic


Neck: Yes: Supple


Cardiovascular: Yes: Regular Rate and Rhythm


Respiratory: Yes: CTA Bilaterally


Gastrointestinal: Yes: Soft.  No: Distention, Tenderness


Edema: LLE: Trace, RLE: Trace


Neurological: Yes: Alert, Oriented, Other (tremors)





ASSESSMENT AND PLAN:


54M with multiple medical problems including HTN HLD bicuspid aortic valve, 

critical aortic stenosis s/p bioprosthetic SAVR, short IABP support and dual 

chamber pacemaker implantation for [facility] 02/27/2018, ETOH use admitted with 

hallucinations and unsteady gait consistent with alcohol withdrawal





Patient no longer delirious but still having whole body tremors 


Deliriuim Tremens, +/- hepatic encephalopathy/Korsakoff's psychosis/Wernicke's 

encephalopathy


Abnormal LFTs/Mixed hyperbilirubinemia, suspect from above


Elevated troponin, suspect demand mediated from above


Cirrhosis,likely alcoholic, with portal htn/splenomegaly/Thrombocytopenia, 

suspect varices


Acute on chronic thrombocytopenia, suspect From alcohol related bone marrow 

suppression over underlying cirrhosis/splenomegaly


bicuspid AV with critical AS s/p bioprosthetic SAVR, short IABP support and 

dual chamber pacemaker implantation for [facility] 02/27/2018


HTN


HLD


rhabdomyolysis 





Plan:


contiue [facility] protocol for alcohol withdrawal 


patient seems to be improving


Detox consult. 


Folate/thiamine 


GGT noted, LFTs trending down.


GI consult noted and appreciated


will get Hep B surface Ab-this was not part of hepatitis panel. if negative 

will give first dose of vaccine. Still pending


PPI for GI PPx


SCDs for DVT PPx-US duplex negative for DVT 


 will need outpatient hepatology follow up upon discharge


continue lactulose TID. 


Neurology consult noted and appreciated


Folate/b12 levels high


agree with holding aspirin-platelets count improved


agree with stopping metoprolol and starting nadolol


Continue spironolactone and losartan 


PT consult when able to participate 


Hold statin till LFts stabilize


CPK improved

## 2019-10-20 NOTE — PN
Progress Note, Physician


History of Present Illness: 


Sensorium improved, less tremulous, easily arousable.





- Current Medication List


Current Medications: 


Active Medications





Lactulose (Cephulac (Oral Use))  20 gm PO TID [facility]


   Last Admin: 10/20/19 06:02 Dose:  20 gm


Lorazepam (Ativan -)  0.5 mg PO Q4H PRN


   PRN Reason: Symptoms of Withdrawal


   Stop: 10/20/19 23:59


Lorazepam (Ativan Injection -)  2 mg IVPUSH Q2H PRN


   PRN Reason: ANXIETY


   Last Admin: 10/19/19 22:29 Dose:  2 mg


Losartan Potassium (Cozaar -)  25 mg PO DAILY [facility]


   Last Admin: 10/20/19 09:22 Dose:  25 mg


Magnesium Oxide (Mag-Ox -)  400 mg PO BID [facility]


   Stop: 10/20/19 22:01


   Last Admin: 10/20/19 09:22 Dose:  400 mg


Nadolol (Corgard -)  40 mg PO DAILY [facility]


   Last Admin: 10/20/19 09:22 Dose:  40 mg


Spironolactone (Aldactone -)  25 mg PO BID [facility]


   Last Admin: 10/20/19 09:22 Dose:  25 mg











- Objective


Vital Signs: 


 Vital Signs











Temperature  98.2 F   10/20/19 10:00


 


Pulse Rate  74   10/20/19 10:00


 


Respiratory Rate  20   10/20/19 10:00


 


Blood Pressure  140/80   10/20/19 10:00


 


O2 Sat by Pulse Oximetry (%)  97   10/20/19 09:00











Constitutional: Yes: No Distress, Calm


Neck: Yes: Supple


Cardiovascular: Yes: Regular Rate and Rhythm


Respiratory: Yes: Regular, CTA Bilaterally


Gastrointestinal: Yes: Normal Bowel Sounds, Soft


Edema: No


Labs: 


 CBC, BMP





 10/20/19 05:30 





 10/20/19 05:30 





 INR, PTT











INR  1.40  (0.83-1.09)  H  10/20/19  05:30    














- ....Imaging


EKG: Report Reviewed (Tele: NSR)





Problem List





- Problems


(1) S/P aortic valve replacement with bioprosthetic valve


Code(s): Z95.3 - PRESENCE OF XENOGENIC HEART VALVE   





(2) Pacemaker


Code(s): Z95.0 - PRESENCE OF CARDIAC PACEMAKER   





(3) Complete heart block, post-surgical


Code(s): I97.89 - OTH POSTPROC COMP AND DISORDERS OF THE CIRC SYS, NEC; I44.2 - 

ATRIOVENTRICULAR BLOCK, COMPLETE   





(4) Altered mental status


Code(s): R41.82 - ALTERED MENTAL STATUS, UNSPECIFIED   


Qualifiers: 


   Altered mental status type: delirium   Qualified Code(s): R41.0 - 

Disorientation, unspecified   





(5) Aortic stenosis, severe


Code(s): I35.0 - NONRHEUMATIC AORTIC (VALVE) STENOSIS   





(6) CHF (congestive heart failure)


Code(s): I50.9 - HEART FAILURE, UNSPECIFIED   


Qualifiers: 


   Heart failure type: diastolic   Heart failure chronicity: chronic   

Qualified Code(s): I50.32 - Chronic diastolic (congestive) heart failure   





(7) Thrombocytopenia


Code(s): D69.6 - THROMBOCYTOPENIA, UNSPECIFIED   





(8) Alcohol withdrawal delirium


Code(s): F10.231 - ALCOHOL DEPENDENCE WITH WITHDRAWAL DELIRIUM   





(9) Portal hypertension


Code(s): K76.6 - PORTAL HYPERTENSION   





Assessment/Plan


CT scan of the abdomen and pelvis revealed an enlarged liver and changes 

consistent with portal hypertension


02/20/18 Echo: Moderate dilated LV with severely decreased LV fxn, normal RV 

size and fxn, mod pulm HTN, mod-severe aortic stenosis BINU 0.66 cm^2, MG 34 mmHg

, pacemaker seen


02/23/2018 R&LHc: Severe global HK LVEF 25% with severe AS BINU 0.8 cm^2, MG 47 

mmHg


05/17/2018 Echo: Normal LV size and fxn with impaired LV relaxation, normal RV 

size and fxn, biatrial sizes, normal functioning aortic bioprosthesis, mild DE, 

TR RVSP 31 mmHg, small-moderate pericardial effusion





1. Altered mental status, hepatic encephalopathy/wernicke's encephlopathy/

korsakoff psychosis, resolving


2. History of critical AS due to bicuspid aortic valve post SAVR/Bioprosthetic 

aortic valve 


3. History of dilated non ischemic cardiomyopathy related to the above noted 

valvular pathology resolved with evidence of demand ischemic injury/

subendocardial ischemia


4. Complete heart block post dual chamber pacemaker


5. Hypertensive cardiovascular disease


6. Pericardial effusion


7. Hyperlipidemia


8. Thrombocytopenia


9. Acute ETOH hepatitis with evidence of chronic liver disease


10. Portal hypertension related to above





PLAN:





1. Continue Cozaar 25 qd


2. Started Nadolol 40 qd in substitution for Toprol XL/in view of the above 

noted portal hypertension


3. Continue Aldactone 25 bid and titrate dosage 


4. Avoid ASA for now pending improvement in the above noted thrombocytopenia


5. Repeat echocardiography study for evaluation of LV function, valvular 

function and pericardial effusion

## 2019-10-21 LAB
ALBUMIN SERPL-MCNC: 2.8 G/DL (ref 3.4–5)
ALP SERPL-CCNC: 76 U/L (ref 45–117)
ALT SERPL-CCNC: 23 U/L (ref 13–61)
ANION GAP SERPL CALC-SCNC: 9 MMOL/L (ref 8–16)
AST SERPL-CCNC: 48 U/L (ref 15–37)
BILIRUB SERPL-MCNC: 1.6 MG/DL (ref 0.2–1)
BUN SERPL-MCNC: 12.4 MG/DL (ref 7–18)
CALCIUM SERPL-MCNC: 8.8 MG/DL (ref 8.5–10.1)
CHLORIDE SERPL-SCNC: 104 MMOL/L (ref 98–107)
CO2 SERPL-SCNC: 22 MMOL/L (ref 21–32)
CREAT SERPL-MCNC: 0.9 MG/DL (ref 0.55–1.3)
DEPRECATED RDW RBC AUTO: 14.3 % (ref 11.9–15.9)
GLUCOSE SERPL-MCNC: 122 MG/DL (ref 74–106)
HCT VFR BLD CALC: 40.5 % (ref 35.4–49)
HGB BLD-MCNC: 13.7 GM/DL (ref 11.7–16.9)
INR BLD: 1.44 (ref 0.83–1.09)
MCH RBC QN AUTO: 33.5 PG (ref 25.7–33.7)
MCHC RBC AUTO-ENTMCNC: 33.9 G/DL (ref 32–35.9)
MCV RBC: 99 FL (ref 80–96)
PLATELET # BLD AUTO: 112 K/MM3 (ref 134–434)
PMV BLD: 9.2 FL (ref 7.5–11.1)
POTASSIUM SERPLBLD-SCNC: 4.3 MMOL/L (ref 3.5–5.1)
PROT SERPL-MCNC: 8.5 G/DL (ref 6.4–8.2)
PT PNL PPP: 17.1 SEC (ref 9.7–13)
RBC # BLD AUTO: 4.09 M/MM3 (ref 4–5.6)
SODIUM SERPL-SCNC: 135 MMOL/L (ref 136–145)
WBC # BLD AUTO: 5.5 K/MM3 (ref 4–10)

## 2019-10-21 RX ADMIN — LACTULOSE SCH: 20 SOLUTION ORAL at 21:45

## 2019-10-21 RX ADMIN — LACTULOSE SCH GM: 20 SOLUTION ORAL at 06:14

## 2019-10-21 RX ADMIN — SPIRONOLACTONE SCH MG: 25 TABLET, FILM COATED ORAL at 09:31

## 2019-10-21 RX ADMIN — LOSARTAN POTASSIUM SCH MG: 25 TABLET, FILM COATED ORAL at 09:31

## 2019-10-21 RX ADMIN — LACTULOSE SCH: 20 SOLUTION ORAL at 15:44

## 2019-10-21 RX ADMIN — SPIRONOLACTONE SCH MG: 25 TABLET, FILM COATED ORAL at 21:45

## 2019-10-21 RX ADMIN — NADOLOL SCH MG: 40 TABLET ORAL at 09:31

## 2019-10-21 NOTE — ECHO
______________________________________________________________________________



Name: JASSON CAMPUZANO                                       Exam:Adult Echocardiogram

MRN: [id number]         Study Date: 10/21/2019 03:37 PM

Age: 54 yrs

______________________________________________________________________________



Reason For Study: cardiomyopathy, elevated/borderline TN

Height: 66 in        Weight: 185 lb        BSA: 1.9 m2



______________________________________________________________________________



MMode/2D Measurements & Calculations

IVSd: 1.2 cm                                       Ao root diam: 2.9 cm

LVIDd: 4.1 cm                                      LA dimension: 2.7 cm

LVIDs: 2.8 cm

LVPWd: 1.2 cm



____________________________________________________

LVPWs: 1.7 cm                                      EDV(Teich): 74.5 ml

                                                   ESV(Teich): 29.6 ml



____________________________________________________

LVOT diam: 1.8 cm



Doppler Measurements & Calculations

MV E max eliza: 76.0 cm/sec                                Ao V2 max: 254.0 cm/sec

MV A max eliza: 110.6 cm/sec                               Ao max P.8 mmHg

MV E/A: 0.69                                             Ao V2 mean: 184.4 cm/sec

MV dec time: 0.32 sec                                    Ao mean PG: 15.8 mmHg

                                                         Ao V2 VTI: 46.3 cm



                                                         BINU(V,D): 0.75 cm2

__________________________________________________________

LV V1 max P.5 mmHg                                   PA V2 max: 105.2 cm/sec

LV V1 max: 79.5 cm/sec                                   PA max P.4 mmHg





______________________________________________________________________________





Tech Comments

pt non cooperative on and off throughout test.

Procedure

A complete two-dimensional transthoracic echocardiogram was performed (2D, M-mode, Doppler and color 
flow

Doppler).

Left Ventricle

The left ventricle is normal in size. There is mild concentric left ventricular hypertrophy. Left ravi
tricular

systolic function is normal. Ejection Fraction = 55-60%. No regional wall motion abnormalities noted.


Right Ventricle

The right ventricle is not well visualized.

Atria

The left atrial size is normal. Right atrial size is normal.

Mitral Valve

There is mild mitral annular calcification. There is trace to mild mitral regurgitation.

Tricuspid Valve

The tricuspid valve is normal in structure and function. No tricuspid regurgitation.

Aortic Valve

There is a bioprosthetic aortic valve. The prosthetic aortic valve is well-seated. Aortic mean pressu
re

gradient= 19 mmHg. No aortic regurgitation is present.

Pulmonic Valve

The pulmonic valve is not well visualized.

Great Vessels

The aortic root is normal size.

Pericardium/Pleura

There is no pericardial effusion.

______________________________________________________________________________





Interpretation Summary

The left ventricle is normal in size.

There is mild concentric left ventricular hypertrophy.

Left ventricular systolic function is normal.

No regional wall motion abnormalities noted.

Ejection Fraction = 55-60%.

The right ventricle is not well visualized.

The left atrial size is normal.

Right atrial size is normal.

There is mild mitral annular calcification.

There is trace to mild mitral regurgitation.

There is a bioprosthetic aortic valve.

The prosthetic aortic valve is well-seated.

Aortic mean pressure gradient= 19 mmHg

No aortic regurgitation is present.

There is no pericardial effusion.





Marito Sinclair MD 10/21/2019 05:48 PM

## 2019-10-21 NOTE — PN
Teaching Attending Note


Name of Resident: Chepe Hernandez





ATTENDING PHYSICIAN STATEMENT





I saw and evaluated the patient.


I reviewed the resident's note and discussed the case with the resident.


I agree with the resident's findings and plan as documented.








SUBJECTIVE: Patient has no complaints.








OBJECTIVE:


 Vital Signs











 Period  Temp  Pulse  Resp  BP Sys/Lombardi  Pulse Ox


 


 Last 24 Hr  97.7 F-99.3 F  63-84  20-20  100-128/71-78  97-99








HEART: S1S2, RRR


LUNGS: Clear


ABDOMEN: Soft, non-tender, non-distended, normal BS


EXTREMITIES: No edema





 Laboratory Results - last 24 hr











  10/21/19 10/21/19 10/21/19





  08:50 08:50 08:50


 


WBC  5.5  


 


RBC  4.09  


 


Hgb  13.7  


 


Hct  40.5  


 


MCV  99.0 H  


 


MCH  33.5  


 


MCHC  33.9  


 


RDW  14.3  


 


Plt Count  112 L D  


 


MPV  9.2  


 


PT with INR   17.10 H 


 


INR   1.44 H 


 


Sodium    135 L


 


Potassium    4.3


 


Chloride    104


 


Carbon Dioxide    22


 


Anion Gap    9


 


BUN    12.4


 


Creatinine    0.9


 


Est GFR (CKD-EPI)AfAm    112.62


 


Est GFR (CKD-EPI)NonAf    97.17


 


Random Glucose    122 H


 


Calcium    8.8


 


Total Bilirubin    1.6 H


 


AST    48 H


 


ALT    23


 


Alkaline Phosphatase    76


 


Total Protein    8.5 H


 


Albumin    2.8 L








 Current Medications











Generic Name Dose Route Start Last Admin





  Trade Name Freq  PRN Reason Stop Dose Admin


 


Acetaminophen  650 mg  10/20/19 12:59  10/20/19 14:26





  Tylenol -  PO   650 mg





  Q6H PRN   Administration





  Fever Or Pain   





     





     





     


 


Lactulose  20 gm  10/18/19 18:15  10/21/19 06:14





  Cephulac (Oral Use)  PO   20 gm





  TID DEREK   Administration





     





     





     





     


 


Lorazepam  2 mg  10/18/19 23:59  10/19/19 22:29





  Ativan Injection -  IVPUSH   2 mg





  Q2H PRN   Administration





  ANXIETY   





     





     





     


 


Losartan Potassium  25 mg  10/18/19 10:00  10/21/19 09:31





  Cozaar -  PO   25 mg





  DAILY DEREK   Administration





     





     





     





     


 


Nadolol  40 mg  10/20/19 10:00  10/21/19 09:31





  Corgard -  PO   40 mg





  DAILY DEREK   Administration





     





     





     





     


 


Spironolactone  25 mg  10/19/19 22:00  10/21/19 09:31





  Aldactone -  PO   25 mg





  BID DEREK   Administration





     





     





     





     














ASSESSMENT AND PLAN:


This is a 54 year old man with a history of HTN, hyperlipidemia, chronic 

systolic heart failure, dilated cardiomyopathy, bioprosthetic SAVR for critical 

AS secondary to bicuspid AV, pacemaker for 3rd degree AVB, alcohol abuse who 

presented to the ED with hallucinations.





1. Delirium tremens


   - Resolved


2. Alcohol withdrawal


   - Continue Ativan as needed


3. Continuous alcohol dependence


   - Start thiamine, folic acid, multivitamin


4. Alcoholic cirrhosis with portal HTN and splenomegaly


   - Continue Corgard, Aldactone, Lactulose


   - HBsAb pending - if negative, will give hepatitis B vaccine


   - Follow-up at liver center


5. Thrombocytopenia


   - Platelets improving


6. Demand ischemia


7. HTN


   - Continue Cozaar, Corgard, Aldactone


8. Hyperlipidemia


   - Lipitor held secondary to liver disease


9. Chronic systolic heart failure secondary to dilated cardiomyopathy


10. Critical AS secondary to bicuspid AV


   - History of bioprosthetic SAVR


11. 3rd degree AVB


   - History of pacemaker


12. Rhabdomyolysis 


   - Resolved

## 2019-10-21 NOTE — PN
Physical Exam: 


SUBJECTIVE: Patient seen and examined


NAEON. Last Ativan was yseterday noon-time


Denies hallucinations, HA, agitation. States that his tremors are due to his 

nervousness








OBJECTIVE:





 Vital Signs











 Period  Temp  Pulse  Resp  BP Sys/Lombardi  Pulse Ox


 


 Last 24 Hr  97.7 F-99.3 F  63-84  20-20  100-128/64-78  97-99








CIWA 5(fine tremos, agitation)


GENERAL: The patient is awake, alert to name. Appears agitated


HEAD: NC. No diaphoresis


EYES: mild scleral icterus, conjunctiva clear.  


ENT: Ears normal, nares patent, moist mucous membranes.


NECK: Trachea midline, full range of motion, supple. 


LUNGS: Breath sounds equal, clear to auscultation bilaterally, no wheezes, no 

crackles, no accessory muscle use. 


HEART: normal HR, S1, S2 without murmur, rub or gallop. Palpable Left chest 

PPM. 


ABDOMEN: Soft, nontender, nondistended, no guarding, no rebound.


EXTREMITIES: 2+ pulses, warm, well-perfused, no edema. 


NEUROLOGICAL: alert, calm, following commands and conversational


SKIN: dry, warm














 Laboratory Results - last 24 hr











  10/20/19 10/21/19 10/21/19





  05:30 08:50 08:50


 


WBC   5.5 


 


RBC   4.09 


 


Hgb   13.7 


 


Hct   40.5 


 


MCV   99.0 H 


 


MCH   33.5 


 


MCHC   33.9 


 


RDW   14.3 


 


Plt Count   112 L D 


 


MPV   9.2 


 


PT with INR    17.10 H


 


INR    1.44 H


 


Sodium   


 


Potassium   


 


Chloride   


 


Carbon Dioxide   


 


Anion Gap   


 


BUN   


 


Creatinine   


 


Est GFR (CKD-EPI)AfAm   


 


Est GFR (CKD-EPI)NonAf   


 


Random Glucose   


 


Calcium   


 


Total Bilirubin   


 


AST   


 


ALT   


 


Alkaline Phosphatase   


 


Total Protein   


 


Albumin   


 


Hep Bs Antibody  Non reactive  














  10/21/19





  08:50


 


WBC 


 


RBC 


 


Hgb 


 


Hct 


 


MCV 


 


MCH 


 


MCHC 


 


RDW 


 


Plt Count 


 


MPV 


 


PT with INR 


 


INR 


 


Sodium  135 L


 


Potassium  4.3


 


Chloride  104


 


Carbon Dioxide  22


 


Anion Gap  9


 


BUN  12.4


 


Creatinine  0.9


 


Est GFR (CKD-EPI)AfAm  112.62


 


Est GFR (CKD-EPI)NonAf  97.17


 


Random Glucose  122 H


 


Calcium  8.8


 


Total Bilirubin  1.6 H


 


AST  48 H


 


ALT  23


 


Alkaline Phosphatase  76


 


Total Protein  8.5 H


 


Albumin  2.8 L


 


Hep Bs Antibody 








Active Medications











Generic Name Dose Route Start Last Admin





  Trade Name Freq  PRN Reason Stop Dose Admin


 


Acetaminophen  650 mg  10/20/19 12:59  10/20/19 14:26





  Tylenol -  PO   650 mg





  Q6H PRN   Administration





  Fever Or Pain   





     





     





     


 


Folic Acid  1 mg  10/22/19 10:00  





  Folic Acid -  PO   





  DAILY [facility]   





     





     





     





     


 


Lactulose  20 gm  10/18/19 18:15  10/21/19 15:44





  Cephulac (Oral Use)  PO   Not Given





  TID DEREK   





     





     





     





     


 


Lorazepam  2 mg  10/18/19 23:59  10/19/19 22:29





  Ativan Injection -  IVPUSH   2 mg





  Q2H PRN   Administration





  ANXIETY   





     





     





     


 


Losartan Potassium  25 mg  10/18/19 10:00  10/21/19 09:31





  Cozaar -  PO   25 mg





  DAILY DEREK   Administration





     





     





     





     


 


Multivitamins/Minerals  1 each  10/22/19 10:00  





  Theragran-M  PO   





  DAILY DEREK   





     





     





     





     


 


Nadolol  40 mg  10/20/19 10:00  10/21/19 09:31





  Corgard -  PO   40 mg





  DAILY DEREK   Administration





     





     





     





     


 


Spironolactone  25 mg  10/19/19 22:00  10/21/19 09:31





  Aldactone -  PO   25 mg





  BID DEREK   Administration





     





     





     





     


 


Thiamine HCl  100 mg  10/22/19 10:00  





  Vitamin B1 -  PO   





  DAILY [facility]   





     





     





     





     











ASSESSMENT/PLAN:


Patient is a 55 y/o male with a history of HTN, HLD, and HFrER with pacemaker 

who presents for 2 days of CP and visual hallucinations.





#AMS -- likely 2/2 to EtOH withdrawal --improving


> CIWA 5


> CT A/P(10/17/19): liver 17.2cm, cirrhosis, splenomegaly, possible esophageal 

varices


> CT H(10/17/19): neg


> B12 ~1444


> Folate ~18


- Ativan protocol


- Neuro Consult


   --thinking likely hepatic encephalopathy


   --ativan taper/B1/B12/folate supplementation 


- GI Consult


   --lactulose 30mg daily


   --hepatitis panel -- pending





#Cirrhosis 


> Nabor B -> Nabor A


> Tbil 2.7, INR 1.3, Alb 3.0 ---> Tbil 1.6, INR 1.44, Alb 2.8


> ammonia level ~ 37


- lactulose TID


- spironlactone BID





#tropinemia


> troponin 0.06->0.07->0.06->0.04


- monitor patient on tele


- continuous cardiac monitoring


- f/u repeat echo -- pending





#thrombocytopenia


> Plt 112


- holding DVT ppx and ASA


- trend daily Plt





#HLD


- continue atorvastatin





#HTN


-continue home losartan


-stopped home Toprol XL, started Nadolol





#Aortic Valve Replacement(Magna 25)


#Complete Heart Block


- continue spironolactone


- per patient has a dual chamber pacemaker


- Cardio Consult:


   --trend trops


   --fu rpt echo


   --substitue Toprolol for Nadolol 40mg QD, losartan 25 qd, aldactone 25 qd


   --hold ASA until resolution of thrombocytopenia





DVT ppx


> venous duplex(10/18/19): neg


- SCD's





FEN


- sodium controlled diet





Dispo: monitor on tele


-dc plan: refusing rehab








Visit type





- Emergency Visit


Emergency Visit: No





- New Patient


This patient is new to me today: No





- Critical Care


Critical Care patient: No





ATTENDING PHYSICIAN STATEMENT





I saw and evaluated the patient.


I reviewed the resident's note and discussed the case with the resident.


I agree with the resident's findings and plan as documented.








SUBJECTIVE:








OBJECTIVE:








ASSESSMENT AND PLAN:

## 2019-10-21 NOTE — PN
Progress Note, Physician


History of Present Illness: 


Sensorium improved to baseline, no longer tremulous, normally interactive.





- Current Medication List


Current Medications: 


Active Medications





Acetaminophen (Tylenol -)  650 mg PO Q6H PRN


   PRN Reason: Fever Or Pain


   Last Admin: 10/20/19 14:26 Dose:  650 mg


Lactulose (Cephulac (Oral Use))  20 gm PO TID [facility]


   Last Admin: 10/21/19 06:14 Dose:  20 gm


Lorazepam (Ativan Injection -)  2 mg IVPUSH Q2H PRN


   PRN Reason: ANXIETY


   Last Admin: 10/19/19 22:29 Dose:  2 mg


Losartan Potassium (Cozaar -)  25 mg PO DAILY [facility]


   Last Admin: 10/20/19 09:22 Dose:  25 mg


Nadolol (Corgard -)  40 mg PO DAILY [facility]


   Last Admin: 10/20/19 09:22 Dose:  40 mg


Spironolactone (Aldactone -)  25 mg PO BID [facility]


   Last Admin: 10/20/19 21:27 Dose:  25 mg











- Objective


Vital Signs: 


 Vital Signs











Temperature  99.3 F   10/21/19 04:49


 


Pulse Rate  63   10/21/19 04:49


 


Respiratory Rate  20   10/21/19 04:49


 


Blood Pressure  100/72   10/21/19 04:49


 


O2 Sat by Pulse Oximetry (%)  97   10/20/19 21:00











Constitutional: Yes: No Distress, Calm


Neck: Yes: Supple


Cardiovascular: Yes: Regular Rate and Rhythm


Respiratory: Yes: Regular, CTA Bilaterally


Gastrointestinal: Yes: Normal Bowel Sounds, Soft


Edema: No


Labs: 


 INR, PTT











INR  1.40  (0.83-1.09)  H  10/20/19  05:30    














Problem List





- Problems


(1) S/P aortic valve replacement with bioprosthetic valve


Code(s): Z95.3 - PRESENCE OF XENOGENIC HEART VALVE   





(2) Pacemaker


Code(s): Z95.0 - PRESENCE OF CARDIAC PACEMAKER   





(3) Complete heart block, post-surgical


Code(s): I97.89 - OTH POSTPROC COMP AND DISORDERS OF THE CIRC SYS, NEC; I44.2 - 

ATRIOVENTRICULAR BLOCK, COMPLETE   





(4) Altered mental status


Code(s): R41.82 - ALTERED MENTAL STATUS, UNSPECIFIED   


Qualifiers: 


   Altered mental status type: delirium   Qualified Code(s): R41.0 - 

Disorientation, unspecified   





(5) Aortic stenosis, severe


Code(s): I35.0 - NONRHEUMATIC AORTIC (VALVE) STENOSIS   





(6) CHF (congestive heart failure)


Code(s): I50.9 - HEART FAILURE, UNSPECIFIED   


Qualifiers: 


   Heart failure type: diastolic   Heart failure chronicity: chronic   

Qualified Code(s): I50.32 - Chronic diastolic (congestive) heart failure   





(7) Thrombocytopenia


Code(s): D69.6 - THROMBOCYTOPENIA, UNSPECIFIED   





(8) Alcohol withdrawal delirium


Code(s): F10.231 - ALCOHOL DEPENDENCE WITH WITHDRAWAL DELIRIUM   





(9) Portal hypertension


Code(s): K76.6 - PORTAL HYPERTENSION   





Assessment/Plan


CT scan of the abdomen and pelvis revealed an enlarged liver and changes 

consistent with portal hypertension


02/20/18 Echo: Moderate dilated LV with severely decreased LV fxn, normal RV 

size and fxn, mod pulm HTN, mod-severe aortic stenosis BINU 0.66 cm^2, MG 34 mmHg

, pacemaker seen


02/23/2018 R&LHc: Severe global HK LVEF 25% with severe AS BINU 0.8 cm^2, MG 47 

mmHg


05/17/2018 Echo: Normal LV size and fxn with impaired LV relaxation, normal RV 

size and fxn, biatrial sizes, normal functioning aortic bioprosthesis, mild DE, 

TR RVSP 31 mmHg, small-moderate pericardial effusion





1. Altered mental status, hepatic encephalopathy/wernicke's encephlopathy/

korsakoff psychosis, resolved


2. History of critical AS due to bicuspid aortic valve post SAVR/Bioprosthetic 

aortic valve 


3. History of dilated non ischemic cardiomyopathy related to the above noted 

valvular pathology resolved with evidence of demand ischemic injury/

subendocardial ischemia


4. Complete heart block post dual chamber pacemaker


5. Hypertensive cardiovascular disease


6. Pericardial effusion


7. Hyperlipidemia


8. Thrombocytopenia


9. Acute ETOH hepatitis with evidence of chronic liver disease


10. Portal hypertension related to above





PLAN:





1. Continue Cozaar 25 qd


2. Continue Nadolol 40 qd in substitution for Toprol XL/in view of the above 

noted portal hypertension


3. Continue Aldactone 25 bid and titrate dosage 


4. Avoid ASA for now pending improvement in the above noted thrombocytopenia


5. F/u repeat echocardiography study for evaluation of LV function, valvular 

function and pericardial effusion


6. D/c planning to alcohol rehab?

## 2019-10-22 VITALS — HEART RATE: 60 BPM

## 2019-10-22 VITALS — TEMPERATURE: 98.6 F | SYSTOLIC BLOOD PRESSURE: 121 MMHG | DIASTOLIC BLOOD PRESSURE: 84 MMHG

## 2019-10-22 LAB
ALBUMIN SERPL-MCNC: 2.6 G/DL (ref 3.4–5)
ALP SERPL-CCNC: 75 U/L (ref 45–117)
ALT SERPL-CCNC: 20 U/L (ref 13–61)
ANION GAP SERPL CALC-SCNC: 8 MMOL/L (ref 8–16)
AST SERPL-CCNC: 40 U/L (ref 15–37)
BASOPHILS # BLD: 0.7 % (ref 0–2)
BILIRUB SERPL-MCNC: 1.1 MG/DL (ref 0.2–1)
BUN SERPL-MCNC: 20.9 MG/DL (ref 7–18)
CALCIUM SERPL-MCNC: 8.6 MG/DL (ref 8.5–10.1)
CHLORIDE SERPL-SCNC: 102 MMOL/L (ref 98–107)
CO2 SERPL-SCNC: 23 MMOL/L (ref 21–32)
CREAT SERPL-MCNC: 0.9 MG/DL (ref 0.55–1.3)
DEPRECATED RDW RBC AUTO: 13.8 % (ref 11.9–15.9)
EOSINOPHIL # BLD: 1.1 % (ref 0–4.5)
GLUCOSE SERPL-MCNC: 99 MG/DL (ref 74–106)
HCT VFR BLD CALC: 38.1 % (ref 35.4–49)
HGB BLD-MCNC: 13 GM/DL (ref 11.7–16.9)
INR BLD: 1.33 (ref 0.83–1.09)
LYMPHOCYTES # BLD: 21.6 % (ref 8–40)
MAGNESIUM SERPL-MCNC: 1.8 MG/DL (ref 1.8–2.4)
MCH RBC QN AUTO: 33.6 PG (ref 25.7–33.7)
MCHC RBC AUTO-ENTMCNC: 34.1 G/DL (ref 32–35.9)
MCV RBC: 98.4 FL (ref 80–96)
MONOCYTES # BLD AUTO: 19.1 % (ref 3.8–10.2)
NEUTROPHILS # BLD: 57.5 % (ref 42.8–82.8)
PHOSPHATE SERPL-MCNC: 3.3 MG/DL (ref 2.5–4.9)
PLATELET # BLD AUTO: 115 K/MM3 (ref 134–434)
PMV BLD: 8.9 FL (ref 7.5–11.1)
POTASSIUM SERPLBLD-SCNC: 3.8 MMOL/L (ref 3.5–5.1)
PROT SERPL-MCNC: 8 G/DL (ref 6.4–8.2)
PT PNL PPP: 15.7 SEC (ref 9.7–13)
RBC # BLD AUTO: 3.88 M/MM3 (ref 4–5.6)
SODIUM SERPL-SCNC: 132 MMOL/L (ref 136–145)
WBC # BLD AUTO: 4.9 K/MM3 (ref 4–10)

## 2019-10-22 RX ADMIN — LOSARTAN POTASSIUM SCH MG: 25 TABLET, FILM COATED ORAL at 10:20

## 2019-10-22 RX ADMIN — SPIRONOLACTONE SCH MG: 25 TABLET, FILM COATED ORAL at 10:20

## 2019-10-22 RX ADMIN — LACTULOSE SCH: 20 SOLUTION ORAL at 14:16

## 2019-10-22 RX ADMIN — NADOLOL SCH MG: 40 TABLET ORAL at 10:21

## 2019-10-22 RX ADMIN — LACTULOSE SCH: 20 SOLUTION ORAL at 06:14

## 2019-10-22 NOTE — DS
Physical Exam: 


SUBJECTIVE: Patient seen and examined








OBJECTIVE:





 Vital Signs











 Period  Temp  Pulse  Resp  BP Sys/Lombardi  Pulse Ox


 


 Last 24 Hr  98.4 F-98.7 F  60-70  18-20  /52-84  97-97








PHYSICAL EXAM





GENERAL: The patient is awake, alert, and fully oriented, in no acute distress.


HEAD: Normal with no signs of trauma.


EYES: PERRL, extraocular movements intact, sclera anicteric, conjunctiva clear. 


ENT: Ears normal, nares patent, oropharynx clear without exudates, moist mucous 

membranes.


NECK: Trachea midline, full range of motion, supple. 


LUNGS: Breath sounds equal, clear to auscultation bilaterally, no wheezes, no 

crackles, no accessory muscle use. 


HEART: Regular rate and rhythm, S1, S2 without murmur, rub or gallop.


ABDOMEN: Soft, nontender, nondistended, normoactive bowel sounds, no guarding, 

no rebound, no hepatosplenomegaly, no masses.


EXTREMITIES: 2+ pulses, warm, well-perfused, no edema. 


NEUROLOGICAL: Cranial nerves II through XII grossly intact. Normal speech, gait 

not observed.


PSYCH: Normal mood, normal affect.


SKIN: Warm, dry, normal turgor, no rashes or lesions noted.





LABS


 Laboratory Results - last 24 hr











  10/22/19 10/22/19 10/22/19





  05:50 05:50 05:50


 


WBC  4.9  


 


RBC  3.88 L  


 


Hgb  13.0  


 


Hct  38.1  


 


MCV  98.4 H  


 


MCH  33.6  


 


MCHC  34.1  


 


RDW  13.8  


 


Plt Count  115 L  


 


MPV  8.9  


 


Absolute Neuts (auto)  2.8  


 


Neutrophils %  57.5  


 


Lymphocytes %  21.6  


 


Monocytes %  19.1 H  


 


Eosinophils %  1.1  D  


 


Basophils %  0.7  


 


Nucleated RBC %  0  


 


PT with INR   15.70 H 


 


INR   1.33 H 


 


Sodium    132 L


 


Potassium    3.8


 


Chloride    102


 


Carbon Dioxide    23


 


Anion Gap    8


 


BUN    20.9 H


 


Creatinine    0.9


 


Est GFR (CKD-EPI)AfAm    112.62


 


Est GFR (CKD-EPI)NonAf    97.17


 


Random Glucose    99


 


Calcium    8.6


 


Phosphorus    3.3


 


Magnesium    1.8


 


Total Bilirubin    1.1 H


 


AST    40 H


 


ALT    20


 


Alkaline Phosphatase    75


 


Total Protein    8.0


 


Albumin    2.6 L











HOSPITAL COURSE:





Date of Admission:10/17/19





Date of Discharge: 10/22/19














Discharge Summary


Problems reviewed: Yes


Reason For Visit: CONGESTIVE HEART FAILURE;ALTERED MENTAL STATUS


Condition: Stable





- Instructions


Diet, Activity, Other Instructions: 


You were evaluated in the hospital for confusion with associated 

hallucinations.  This was likely due to alcohol withdrawal. You completed detox 

while you were here in the hospital. CAT scan of your abdomen showed that your 

liver is damaged and this scarring is causing a buildup of pressure in your 

veins. You were evaluated by the gastroenterologist and the cardiologist and 

some changes to your medications were made. 








Please take note of the following changes to your medications:


- NEW medications:


   -- Nadolol 40mg, once daily


   -- Lactulose 20g, three times daily; decrease dose as needed to keep bowel 

movements at 3-4 times per day. 


- CHANGE medications:


   -- spironolactone 25mg, twice daily


- Please STOP taking Metoprolol 25mg. 


- continue other home medications








Please follow-up with the doctors below:


- Cardiologist(Dr Barrera Cardenas)


- Gastroenterologist(Dr Logan Schneider)


- PCP(or set-up a PCP at our with clinic with Dr Sanches)








Additional instructions:


- diet: maintain a LOW SALT and heart healthy diet(low sugar/salt/fat, high 

fiber)


- activity: normal activity as tolerated


- avoid substances of abuse such as alcohol, cocaine, marijuana








Please seek immediate medical evaluation or go to the Emergency Department if 

you experience:


- shortness of breath, trouble breathing, chest pain


- confusion, hallucinations, tremors, seizures





*****************





Usted fue evaluado en el hospital por confusin con alucinaciones asociadas. 

[place] probablemente se christen a la abstinencia de alcohol. Completaste la 

desintoxicacin mientras estabas aqu en el hospital. La tomografa 

computarizada de [place] abdomen mostr que [place] hgado est daado y esta 

cicatrizacin est causando ángel acumulacin de presin en jay venas. Usted fue 

evaluado por el gastroenterlogo y el cardilogo y se hicieron algunos cambios 

a jay medicamentos.








Eros nota de los siguientes cambios en jay medicamentos:


- NUEVOS medicamentos:


   - Nadolol 40mg, ángel vez al da


   - Lactulosa 20 g, zeenat veces al da; disminuya la dosis segn sea necesario 

para mantener las evacuaciones intestinales a 3-4 veces por da.


- CAMBIO de medicamentos:


   - Espironolactona 25 mg, dos veces al da


- DEJE de perez metoprolol 25mg.


- continuar otros medicamentos caseros








Erasmo un seguimiento con los mdicos a continuacin:


- Cardilogo (Dr. Barrera Cardenas)


- Gastroenterlogo (Dr. Logan Schneider)


- PCP (o configurar un PCP en nuestra clnica con el Dr. Sanches)








Instrucciones adicionales:


- dieta: mantenga ángel SAL BAJA y ángel dieta saludable para el corazn (baja en 

azcar / sal / grasa, iglesia en fibra)


- actividad: actividad normal segn lo tolerado


- evitar sustancias de abuso kavita el alcohol, la cocana, la marihuana








Busque ángel evaluacin mdica inmediata o vaya al Departamento de emergencias si 

experimenta:


- falta de aliento, dificultad para respirar, dolor en el pecho


- confusin, alucinaciones, temblores, convulsiones


Referrals: 


Gabe Schneider DO [Staff Physician] - 


Chris Cuadra MD [Primary Care Provider] - 


Barrera Cardenas MD [Staff Physician] - 


Ventura Sanches MD [Staff Physician] - 


Disposition: HOME





- Home Medications


Comprehensive Discharge Medication List: 


Ambulatory Orders





Aspirin 81 mg PO DAILY 10/17/19 


Atorvastatin Ca [Lipitor] 20 mg PO DAILY 10/17/19 


Losartan Potassium 25 mg PO DAILY 10/17/19 


Spironolactone 25 mg PO DAILY 10/17/19 


Folic Acid - 1 mg PO DAILY #30 tablet 10/22/19 


Lactulose (Oral Use) [Cephulac -] 20 gm PO TID #60 udc 10/22/19 


Nadolol [Corgard -] 40 mg PO DAILY #30 tablet 10/22/19 


Thiamine HCl [Vitamin B1 -] 100 mg PO DAILY #30 tablet 10/22/19 











ATTENDING PHYSICIAN STATEMENT





I saw and evaluated the patient.


I reviewed the resident's note and discussed the case with the resident.


I agree with the resident's findings and plan as documented.








SUBJECTIVE:








OBJECTIVE:








ASSESSMENT AND PLAN:

## 2019-10-22 NOTE — PN
Teaching Attending Note


Name of Resident: Barrera Donahue





ATTENDING PHYSICIAN STATEMENT





I saw and evaluated the patient.


I reviewed the resident's note and discussed the case with the resident.


I agree with the resident's findings and plan as documented.








SUBJECTIVE: No complaints. No hallucinations/tremors/nnausea/vomiting. No fever/

chills/sweats








OBJECTIVE: Afebrile, hemodynamically Stable. Mild tremor. 


 Last Vital Signs











Temp Pulse Resp BP Pulse Ox


 


 98.6 F   60   18   121/84   97 


 


 10/22/19 12:00  10/22/19 12:00  10/22/19 12:00  10/22/19 12:00  10/22/19 09:00








HEENT: Atraumatic, Normocephalic.


HEART: S1S2, RRR


LUNGS: Clear to auscultation


ABDOMEN: Soft, non-tender, non-distended, normal BS


EXTREMITIES: No edema, no calf tenderness. 


NEURO: AAO x 3 Tone/Power normal all 4 extremities. RIGO.


 Laboratory Results - last 24 hr











  10/20/19 10/22/19 10/22/19





  05:30 05:50 05:50


 


WBC   4.9 


 


RBC   3.88 L 


 


Hgb   13.0 


 


Hct   38.1 


 


MCV   98.4 H 


 


MCH   33.6 


 


MCHC   34.1 


 


RDW   13.8 


 


Plt Count   115 L 


 


MPV   8.9 


 


Absolute Neuts (auto)   2.8 


 


Neutrophils %   57.5 


 


Lymphocytes %   21.6 


 


Monocytes %   19.1 H 


 


Eosinophils %   1.1  D 


 


Basophils %   0.7 


 


Nucleated RBC %   0 


 


PT with INR    15.70 H


 


INR    1.33 H


 


Sodium   


 


Potassium   


 


Chloride   


 


Carbon Dioxide   


 


Anion Gap   


 


BUN   


 


Creatinine   


 


Est GFR (CKD-EPI)AfAm   


 


Est GFR (CKD-EPI)NonAf   


 


Random Glucose   


 


Calcium   


 


Phosphorus   


 


Magnesium   


 


Total Bilirubin   


 


AST   


 


ALT   


 


Alkaline Phosphatase   


 


Total Protein   


 


Albumin   


 


Hep Bs Antibody  Non reactive  














  10/22/19





  05:50


 


WBC 


 


RBC 


 


Hgb 


 


Hct 


 


MCV 


 


MCH 


 


MCHC 


 


RDW 


 


Plt Count 


 


MPV 


 


Absolute Neuts (auto) 


 


Neutrophils % 


 


Lymphocytes % 


 


Monocytes % 


 


Eosinophils % 


 


Basophils % 


 


Nucleated RBC % 


 


PT with INR 


 


INR 


 


Sodium  132 L


 


Potassium  3.8


 


Chloride  102


 


Carbon Dioxide  23


 


Anion Gap  8


 


BUN  20.9 H


 


Creatinine  0.9


 


Est GFR (CKD-EPI)AfAm  112.62


 


Est GFR (CKD-EPI)NonAf  97.17


 


Random Glucose  99


 


Calcium  8.6


 


Phosphorus  3.3


 


Magnesium  1.8


 


Total Bilirubin  1.1 H


 


AST  40 H


 


ALT  20


 


Alkaline Phosphatase  75


 


Total Protein  8.0


 


Albumin  2.6 L


 


Hep Bs Antibody 








 Discharge Medications











 Medication  Instructions  Recorded


 


Aspirin 81 mg PO DAILY 10/17/19


 


Atorvastatin Ca [Lipitor] 20 mg PO DAILY 10/17/19


 


Losartan Potassium 25 mg PO DAILY 10/17/19


 


Spironolactone 25 mg PO DAILY 10/17/19


 


Folic Acid - 1 mg PO DAILY #30 tablet 10/22/19


 


Lactulose (Oral Use) [Cephulac -] 20 gm PO TID #60 udc 10/22/19


 


Nadolol [Corgard -] 40 mg PO DAILY #30 tablet 10/22/19


 


Thiamine HCl [Vitamin B1 -] 100 mg PO DAILY #30 tablet 10/22/19














ASSESSMENT AND PLAN:





54 year old male with a history of HTN, hyperlipidemia, chronic systolic heart 

failure, dilated cardiomyopathy, bioprosthetic AVR for critical AS secondary to 

bicuspid AV, pacemaker for 3rd degree AVB, alcohol abuse presented to the ED 

with hallucinations and was treated for Alcohol withdrawal.





1. Delirium tremens - Resolved s/p alcohol detox protocol. 


Started on Thiamine, folic acid, multivitamin





2. Alcoholic Liver Cirrhosis with portal HTN


Continue Corgard, Aldactone, Lactulose. Metoprolol changed to Nadolol due to 

possible varices


Follow up with Hepatologist.





3. Thrombocytopenia sec to above - Platelets improving





4. HTN - Continue Cozaar, Corgard, Aldactone





5. Hyperlipidemia - Lipitor held secondary to liver disease





6. Hx of Chronic systolic heart failure secondary to dilated cardiomyopathy - 

appears to be resolved. EF up to 50% on Echo. No evidence of CHF exacerbation. 

On BB, ACE-I, Aldactone.


Cardio follow up as out-patient.





7. Critical AS secondary to bicuspid AV s/p bioprosthetic AVR - Cardio 

outpatient follow up.





8. 3rd degree AVB s/p PPM - will follow with Cardio.





9. Acute Rhabdomyolysis - resolved with IV hydration. Troponin egression sec to 

rhabdo - resolved. 





Medically optimized for discharge with GI/Herpatology and Cardiology follow 

ups. Declines in-patient Rehab.

## 2020-04-01 NOTE — CON.NEURO
Consult





- History of Present Illness


History of Present Illness: 


55 y/o male with a history of HTN, HLD, and HFrER with pacemaker who presents 

for 2 days of hallucinations. Patient has never had a history of this and has 

no changes in his life. Patient is non compliant with his medications and does 

not know the names. he only drinks 2-3 beers a week. Per his daughter he has 

been seeing things that are not there and has been grabbing at things. patient 

has also been weaker and unable to walk as well. Patient typically has pain in 

his lower extremities due to his arthritis. patient has been with his wife for 

over 30 years. patient denies any recent sick contacts. State she follows up 

with a cardiologist but does not know the name. Patient feels hot and has not 

had a bowel movement in two days. Denies headache, nausea, vomiting, chest pain

, dizziness, blurry vision, or tingling in extremities. 


lbs elevted ALT, GGT, slightly elevated Ammonia /Bili 


tox (-), b12 NL














- Past Medical History


Cardio/Vascular: Yes: HTN, Hyperlipdemia





- Alcohol/Substance Use


Hx Alcohol Use: Yes





- Smoking History


Smoking history: Never smoked


Have you smoked in the past 12 months: No





Home Medications





- Allergies


Allergies/Adverse Reactions: 


 Allergies











Allergy/AdvReac Type Severity Reaction Status Date / Time


 


No Known Allergies Allergy   Verified 02/19/18 13:22














- Home Medications


Home Medications: 


Ambulatory Orders





Metoprolol Succinate 25 mg PO DAILY 02/19/18 


Aspirin 81 mg PO DAILY 10/17/19 


Atorvastatin Ca [Lipitor] 20 mg PO DAILY 10/17/19 


Losartan Potassium 25 mg PO DAILY 10/17/19 


Spironolactone 25 mg PO DAILY 10/17/19 











Physical Exam-Neuro


Vital Signs: 


 Vital Signs











Temperature  99.1 F   10/18/19 09:41


 


Pulse Rate  123 H  10/18/19 09:41


 


Respiratory Rate  20   10/18/19 09:41


 


Blood Pressure  168/107 H  10/18/19 09:41


 


O2 Sat by Pulse Oximetry (%)  96   10/17/19 20:50











Labs: 


 CBC, BMP





 10/18/19 06:15 





 10/18/19 06:15 





 INR, PTT











INR  1.30  (0.83-1.09)  H  10/18/19  06:15    














- Neuro Exam


Level Of Consciousness: Yes: Stuporous (pt confused, unable to give HX or 

follow requests , + delirium, + tremors BL, restrained, no focal weakness 

distally )





Imaging





- Results


Cat Scan: Report Reviewed, Image Reviewed





Assessment/Plan


55 y/o male with a history of HTN, HLD, and HFrER with pacemaker who presents 

for 2 days of hallucinations. Patient has never had a history of this and has 

no changes in his life. Patient is non compliant with his medications and does 

not know the names. he only drinks 2-3 beers a week. Per his daughter he has 

been seeing things that are not there and has been grabbing at things. patient 

has also been weaker and unable to walk as well. Patient typically has pain in 

his lower extremities due to his arthritis. patient has been with his wife for 

over 30 years. patient denies any recent sick contacts. State she follows up 

with a cardiologist but does not know the name. Patient feels hot and has not 

had a bowel movement in two days. Denies headache, nausea, vomiting, chest pain

, dizziness, blurry vision, or tingling in extremities. 


lbs elevted ALT, GGT, slightly elevated Ammonia /Bili 


tox (-), b12 Nl


HD CT (-0


CT abd/pelvis : suggestive of cirrhosis





AP : Deliirium tremons, with likely hepatic encephalopathy 


metabolic MIRANDA in progress, GI FU 


librium taper/B1/B12/folate supplementation 


needs 1:1


watch for autonomic dysregulation 








DR QUINTANILLA MED

## 2023-04-22 ENCOUNTER — HOSPITAL ENCOUNTER (INPATIENT)
Dept: HOSPITAL 74 - JER | Age: 58
LOS: 11 days | Discharge: HOME | DRG: 433 | End: 2023-05-03
Attending: GENERAL ACUTE CARE HOSPITAL | Admitting: INTERNAL MEDICINE
Payer: COMMERCIAL

## 2023-04-22 VITALS — BODY MASS INDEX: 30.7 KG/M2

## 2023-04-22 DIAGNOSIS — D61.818: ICD-10-CM

## 2023-04-22 DIAGNOSIS — D69.6: ICD-10-CM

## 2023-04-22 DIAGNOSIS — D64.9: ICD-10-CM

## 2023-04-22 DIAGNOSIS — R41.82: ICD-10-CM

## 2023-04-22 DIAGNOSIS — N39.0: ICD-10-CM

## 2023-04-22 DIAGNOSIS — I48.0: ICD-10-CM

## 2023-04-22 DIAGNOSIS — F10.231: ICD-10-CM

## 2023-04-22 DIAGNOSIS — E78.5: ICD-10-CM

## 2023-04-22 DIAGNOSIS — M25.511: ICD-10-CM

## 2023-04-22 DIAGNOSIS — I24.8: ICD-10-CM

## 2023-04-22 DIAGNOSIS — K76.6: ICD-10-CM

## 2023-04-22 DIAGNOSIS — I42.8: ICD-10-CM

## 2023-04-22 DIAGNOSIS — Z91.148: ICD-10-CM

## 2023-04-22 DIAGNOSIS — Z95.2: ICD-10-CM

## 2023-04-22 DIAGNOSIS — R26.81: ICD-10-CM

## 2023-04-22 DIAGNOSIS — R29.6: ICD-10-CM

## 2023-04-22 DIAGNOSIS — K70.30: Primary | ICD-10-CM

## 2023-04-22 DIAGNOSIS — I11.0: ICD-10-CM

## 2023-04-22 DIAGNOSIS — Z95.0: ICD-10-CM

## 2023-04-22 DIAGNOSIS — D68.9: ICD-10-CM

## 2023-04-22 DIAGNOSIS — I50.32: ICD-10-CM

## 2023-04-22 DIAGNOSIS — K76.82: ICD-10-CM

## 2023-04-22 DIAGNOSIS — M25.531: ICD-10-CM

## 2023-04-22 DIAGNOSIS — K92.2: ICD-10-CM

## 2023-04-22 LAB
ALBUMIN SERPL-MCNC: 2.7 G/DL (ref 3.4–5)
ALP SERPL-CCNC: 90 U/L (ref 45–117)
ALT SERPL-CCNC: 24 U/L (ref 13–61)
ANION GAP SERPL CALC-SCNC: 11 MMOL/L (ref 8–16)
APPEARANCE UR: (no result)
APTT BLD: 34.9 SECONDS (ref 25.2–36.5)
AST SERPL-CCNC: 78 U/L (ref 15–37)
BACTERIA # UR AUTO: 6.9 /UL (ref 0–1359)
BASOPHILS # BLD: 0.9 % (ref 0–2)
BILIRUB SERPL-MCNC: 5.3 MG/DL (ref 0.2–1)
BILIRUB UR STRIP.AUTO-MCNC: (no result) MG/DL
BUN SERPL-MCNC: 15.8 MG/DL (ref 7–18)
CALCIUM SERPL-MCNC: 9.2 MG/DL (ref 8.5–10.1)
CASTS URNS QL MICRO: 5 /UL (ref 0–3.1)
CHLORIDE SERPL-SCNC: 101 MMOL/L (ref 98–107)
CO2 SERPL-SCNC: 22 MMOL/L (ref 21–32)
COLOR UR: (no result)
CREAT SERPL-MCNC: 1.1 MG/DL (ref 0.55–1.3)
DEPRECATED RDW RBC AUTO: 15.6 % (ref 11.9–15.9)
EOSINOPHIL # BLD: 0.3 % (ref 0–4.5)
EPITH CASTS URNS QL MICRO: 9 /UL (ref 0–25.1)
GLUCOSE SERPL-MCNC: 102 MG/DL (ref 74–106)
HCT VFR BLD CALC: 42.8 % (ref 35.4–49)
HGB BLD-MCNC: 14.9 GM/DL (ref 11.7–16.9)
INR BLD: 1.96 (ref 0.83–1.09)
KETONES UR QL STRIP: (no result)
LEUKOCYTE ESTERASE UR QL STRIP.AUTO: (no result)
LYMPHOCYTES # BLD: 18.2 % (ref 8–40)
MCH RBC QN AUTO: 34.7 PG (ref 25.7–33.7)
MCHC RBC AUTO-ENTMCNC: 34.8 G/DL (ref 32–35.9)
MCV RBC: 99.7 FL (ref 80–96)
MONOCYTES # BLD AUTO: 16.1 % (ref 3.8–10.2)
NEUTROPHILS # BLD: 64.5 % (ref 42.8–82.8)
NITRITE UR QL STRIP: POSITIVE
PH UR: 5 [PH] (ref 5–8)
PLATELET # BLD AUTO: 15 10^3/UL (ref 134–434)
PMV BLD: 9.9 FL (ref 7.5–11.1)
POTASSIUM SERPLBLD-SCNC: 3.6 MMOL/L (ref 3.5–5.1)
PROT SERPL-MCNC: 8.5 G/DL (ref 6.4–8.2)
PROT UR QL STRIP: (no result)
PROT UR QL STRIP: NEGATIVE
PT PNL PPP: 22.6 SEC (ref 9.7–13)
RBC # BLD AUTO: 4.29 M/MM3 (ref 4–5.6)
RBC # BLD AUTO: 66.4 /UL (ref 0–23.9)
SODIUM SERPL-SCNC: 134 MMOL/L (ref 136–145)
SP GR UR: 1.03 (ref 1.01–1.03)
UROBILINOGEN UR STRIP-MCNC: 2 MG/DL (ref 0.2–1)
WBC # BLD AUTO: 3.5 K/MM3 (ref 4–10)
WBC # UR AUTO: 8 /UL (ref 0–25.8)
YEAST BUDDING URNS QL: NEGATIVE

## 2023-04-22 PROCEDURE — P9034 PLATELETS, PHERESIS: HCPCS

## 2023-04-22 RX ADMIN — RIFAXIMIN SCH MG: 550 TABLET ORAL at 22:08

## 2023-04-22 RX ADMIN — HEPARIN SODIUM SCH: 5000 INJECTION, SOLUTION INTRAVENOUS; SUBCUTANEOUS at 22:08

## 2023-04-22 RX ADMIN — LACTULOSE SCH GM: 20 SOLUTION ORAL at 22:08

## 2023-04-23 LAB
ALBUMIN SERPL-MCNC: 2.4 G/DL (ref 3.4–5)
ALP SERPL-CCNC: 83 U/L (ref 45–117)
ALT SERPL-CCNC: 20 U/L (ref 13–61)
AMYLASE SERPL-CCNC: 75 U/L (ref 25–115)
ANION GAP SERPL CALC-SCNC: 9 MMOL/L (ref 8–16)
APTT BLD: 32.2 SECONDS (ref 25.2–36.5)
AST SERPL-CCNC: 71 U/L (ref 15–37)
BASOPHILS # BLD: 0.8 % (ref 0–2)
BILIRUB SERPL-MCNC: 4.8 MG/DL (ref 0.2–1)
BNP SERPL-MCNC: 403.5 PG/ML (ref 5–125)
BUN SERPL-MCNC: 16.6 MG/DL (ref 7–18)
CALCIUM SERPL-MCNC: 8.6 MG/DL (ref 8.5–10.1)
CHLORIDE SERPL-SCNC: 104 MMOL/L (ref 98–107)
CO2 SERPL-SCNC: 23 MMOL/L (ref 21–32)
CREAT SERPL-MCNC: 1 MG/DL (ref 0.55–1.3)
DEPRECATED RDW RBC AUTO: 15.5 % (ref 11.9–15.9)
EOSINOPHIL # BLD: 0.6 % (ref 0–4.5)
GLUCOSE SERPL-MCNC: 136 MG/DL (ref 74–106)
HCT VFR BLD CALC: 40.3 % (ref 35.4–49)
HGB BLD-MCNC: 13.9 GM/DL (ref 11.7–16.9)
INR BLD: 2.07 (ref 0.83–1.09)
LIPASE SERPL-CCNC: 148 U/L (ref 73–393)
LYMPHOCYTES # BLD: 30.8 % (ref 8–40)
MAGNESIUM SERPL-MCNC: 1.5 MG/DL (ref 1.8–2.4)
MCH RBC QN AUTO: 34.9 PG (ref 25.7–33.7)
MCHC RBC AUTO-ENTMCNC: 34.6 G/DL (ref 32–35.9)
MCV RBC: 100.9 FL (ref 80–96)
MONOCYTES # BLD AUTO: 16.1 % (ref 3.8–10.2)
NEUTROPHILS # BLD: 51.7 % (ref 42.8–82.8)
PHOSPHATE SERPL-MCNC: 2.8 MG/DL (ref 2.5–4.9)
PLATELET # BLD AUTO: 48 10^3/UL (ref 134–434)
PMV BLD: 9 FL (ref 7.5–11.1)
POTASSIUM SERPLBLD-SCNC: 3.4 MMOL/L (ref 3.5–5.1)
PROT SERPL-MCNC: 7.8 G/DL (ref 6.4–8.2)
PT PNL PPP: 23.8 SEC (ref 9.7–13)
RBC # BLD AUTO: 3.99 M/MM3 (ref 4–5.6)
SODIUM SERPL-SCNC: 136 MMOL/L (ref 136–145)
WBC # BLD AUTO: 3.4 K/MM3 (ref 4–10)

## 2023-04-23 RX ADMIN — POTASSIUM CHLORIDE SCH MLS/HR: 7.46 INJECTION, SOLUTION INTRAVENOUS at 12:48

## 2023-04-23 RX ADMIN — LACTULOSE SCH GM: 20 SOLUTION ORAL at 11:46

## 2023-04-23 RX ADMIN — RIFAXIMIN SCH MG: 550 TABLET ORAL at 11:47

## 2023-04-23 RX ADMIN — FOLIC ACID SCH MG: 1 TABLET ORAL at 11:47

## 2023-04-23 RX ADMIN — SPIRONOLACTONE SCH MG: 25 TABLET, FILM COATED ORAL at 11:47

## 2023-04-23 RX ADMIN — CEFTRIAXONE SCH MLS/HR: 1 INJECTION, POWDER, FOR SOLUTION INTRAMUSCULAR; INTRAVENOUS at 11:46

## 2023-04-23 RX ADMIN — LACTULOSE SCH GM: 20 SOLUTION ORAL at 21:15

## 2023-04-23 RX ADMIN — PANTOPRAZOLE SODIUM SCH MG: 40 TABLET, DELAYED RELEASE ORAL at 11:47

## 2023-04-23 RX ADMIN — HEPARIN SODIUM SCH: 5000 INJECTION, SOLUTION INTRAVENOUS; SUBCUTANEOUS at 05:03

## 2023-04-23 RX ADMIN — Medication SCH MG: at 11:47

## 2023-04-23 RX ADMIN — POTASSIUM CHLORIDE SCH MLS/HR: 7.46 INJECTION, SOLUTION INTRAVENOUS at 15:06

## 2023-04-23 RX ADMIN — RIFAXIMIN SCH MG: 550 TABLET ORAL at 21:15

## 2023-04-24 LAB
ALBUMIN SERPL-MCNC: 2.3 G/DL (ref 3.4–5)
ALP SERPL-CCNC: 78 U/L (ref 45–117)
ALT SERPL-CCNC: 21 U/L (ref 13–61)
ANION GAP SERPL CALC-SCNC: 8 MMOL/L (ref 8–16)
APTT BLD: 35.2 SECONDS (ref 25.2–36.5)
AST SERPL-CCNC: 73 U/L (ref 15–37)
BASOPHILS # BLD: 1.1 % (ref 0–2)
BILIRUB SERPL-MCNC: 3.4 MG/DL (ref 0.2–1)
BUN SERPL-MCNC: 14.7 MG/DL (ref 7–18)
CALCIUM SERPL-MCNC: 8.9 MG/DL (ref 8.5–10.1)
CHLORIDE SERPL-SCNC: 107 MMOL/L (ref 98–107)
CO2 SERPL-SCNC: 22 MMOL/L (ref 21–32)
CREAT SERPL-MCNC: 1 MG/DL (ref 0.55–1.3)
DEPRECATED RDW RBC AUTO: 15.6 % (ref 11.9–15.9)
EOSINOPHIL # BLD: 0.9 % (ref 0–4.5)
GLUCOSE SERPL-MCNC: 128 MG/DL (ref 74–106)
HCT VFR BLD CALC: 40 % (ref 35.4–49)
HGB BLD-MCNC: 14 GM/DL (ref 11.7–16.9)
INR BLD: 2.14 (ref 0.83–1.09)
LYMPHOCYTES # BLD: 32.5 % (ref 8–40)
MAGNESIUM SERPL-MCNC: 1.6 MG/DL (ref 1.8–2.4)
MCH RBC QN AUTO: 35.5 PG (ref 25.7–33.7)
MCHC RBC AUTO-ENTMCNC: 35 G/DL (ref 32–35.9)
MCV RBC: 101.6 FL (ref 80–96)
MONOCYTES # BLD AUTO: 18.8 % (ref 3.8–10.2)
NEUTROPHILS # BLD: 46.7 % (ref 42.8–82.8)
PHOSPHATE SERPL-MCNC: 2.9 MG/DL (ref 2.5–4.9)
PLATELET # BLD AUTO: 35 10^3/UL (ref 134–434)
PMV BLD: 9.6 FL (ref 7.5–11.1)
POTASSIUM SERPLBLD-SCNC: 3.2 MMOL/L (ref 3.5–5.1)
PROT SERPL-MCNC: 7.5 G/DL (ref 6.4–8.2)
PT PNL PPP: 24.6 SEC (ref 9.7–13)
RBC # BLD AUTO: 3.94 M/MM3 (ref 4–5.6)
SODIUM SERPL-SCNC: 137 MMOL/L (ref 136–145)
WBC # BLD AUTO: 2.5 K/MM3 (ref 4–10)

## 2023-04-24 RX ADMIN — SPIRONOLACTONE SCH MG: 25 TABLET, FILM COATED ORAL at 09:34

## 2023-04-24 RX ADMIN — LACTULOSE SCH GM: 20 SOLUTION ORAL at 09:34

## 2023-04-24 RX ADMIN — FOLIC ACID SCH MG: 1 TABLET ORAL at 09:34

## 2023-04-24 RX ADMIN — RIFAXIMIN SCH MG: 550 TABLET ORAL at 09:34

## 2023-04-24 RX ADMIN — CEFTRIAXONE SCH MLS/HR: 1 INJECTION, POWDER, FOR SOLUTION INTRAMUSCULAR; INTRAVENOUS at 09:34

## 2023-04-24 RX ADMIN — PANTOPRAZOLE SODIUM SCH MG: 40 TABLET, DELAYED RELEASE ORAL at 09:34

## 2023-04-24 RX ADMIN — Medication SCH MG: at 09:34

## 2023-04-25 LAB
ALBUMIN SERPL-MCNC: 2.4 G/DL (ref 3.4–5)
ALP SERPL-CCNC: 95 U/L (ref 45–117)
ALT SERPL-CCNC: 20 U/L (ref 13–61)
ANION GAP SERPL CALC-SCNC: 7 MMOL/L (ref 8–16)
ANISOCYTOSIS BLD QL: (no result)
AST SERPL-CCNC: 72 U/L (ref 15–37)
BILIRUB SERPL-MCNC: 3.5 MG/DL (ref 0.2–1)
BUN SERPL-MCNC: 11.4 MG/DL (ref 7–18)
CALCIUM SERPL-MCNC: 8.5 MG/DL (ref 8.5–10.1)
CHLORIDE SERPL-SCNC: 103 MMOL/L (ref 98–107)
CO2 SERPL-SCNC: 23 MMOL/L (ref 21–32)
CREAT SERPL-MCNC: 0.9 MG/DL (ref 0.55–1.3)
DEPRECATED RDW RBC AUTO: 16.1 % (ref 11.9–15.9)
GLUCOSE SERPL-MCNC: 101 MG/DL (ref 74–106)
HCT VFR BLD CALC: 42 % (ref 35.4–49)
HGB BLD-MCNC: 14.2 GM/DL (ref 11.7–16.9)
HIV 1+2 AB+HIV1 P24 AG SERPL QL IA: NEGATIVE
INR BLD: 2.1 (ref 0.83–1.09)
MACROCYTES BLD QL: (no result)
MCH RBC QN AUTO: 34.6 PG (ref 25.7–33.7)
MCHC RBC AUTO-ENTMCNC: 33.9 G/DL (ref 32–35.9)
MCV RBC: 102.3 FL (ref 80–96)
PLATELET # BLD AUTO: 28 10^3/UL (ref 134–434)
PMV BLD: 9.1 FL (ref 7.5–11.1)
POTASSIUM SERPLBLD-SCNC: 3.9 MMOL/L (ref 3.5–5.1)
PROT SERPL-MCNC: 7.9 G/DL (ref 6.4–8.2)
PT PNL PPP: 24.2 SEC (ref 9.7–13)
RBC # BLD AUTO: 4.1 M/MM3 (ref 4–5.6)
RETICS # AUTO: 3 % (ref 0.5–1.5)
SODIUM SERPL-SCNC: 133 MMOL/L (ref 136–145)
WBC # BLD AUTO: 3.3 K/MM3 (ref 4–10)

## 2023-04-25 RX ADMIN — LACTULOSE SCH GM: 20 SOLUTION ORAL at 21:54

## 2023-04-25 RX ADMIN — Medication SCH EACH: at 21:54

## 2023-04-25 RX ADMIN — SACUBITRIL AND VALSARTAN SCH TAB: 24; 26 TABLET, FILM COATED ORAL at 21:54

## 2023-04-25 RX ADMIN — Medication PRN MG: at 22:49

## 2023-04-25 RX ADMIN — PANTOPRAZOLE SODIUM SCH MG: 40 TABLET, DELAYED RELEASE ORAL at 13:31

## 2023-04-25 RX ADMIN — SPIRONOLACTONE SCH MG: 25 TABLET, FILM COATED ORAL at 13:31

## 2023-04-25 RX ADMIN — RIFAXIMIN SCH MG: 550 TABLET ORAL at 01:03

## 2023-04-25 RX ADMIN — CEFTRIAXONE SCH: 1 INJECTION, POWDER, FOR SOLUTION INTRAMUSCULAR; INTRAVENOUS at 13:05

## 2023-04-25 RX ADMIN — FOLIC ACID SCH MG: 1 TABLET ORAL at 13:31

## 2023-04-25 RX ADMIN — RIFAXIMIN SCH MG: 550 TABLET ORAL at 21:54

## 2023-04-25 RX ADMIN — LACTULOSE SCH GM: 20 SOLUTION ORAL at 01:04

## 2023-04-25 RX ADMIN — RIFAXIMIN SCH MG: 550 TABLET ORAL at 13:31

## 2023-04-25 RX ADMIN — LACTULOSE SCH GM: 20 SOLUTION ORAL at 13:28

## 2023-04-25 RX ADMIN — Medication SCH MG: at 13:31

## 2023-04-25 RX ADMIN — LIDOCAINE SCH PATCH: 50 PATCH TOPICAL at 15:01

## 2023-04-26 RX ADMIN — Medication SCH MG: at 11:04

## 2023-04-26 RX ADMIN — SPIRONOLACTONE SCH MG: 25 TABLET, FILM COATED ORAL at 11:01

## 2023-04-26 RX ADMIN — LACTULOSE SCH GM: 20 SOLUTION ORAL at 21:15

## 2023-04-26 RX ADMIN — Medication SCH EACH: at 21:17

## 2023-04-26 RX ADMIN — RIFAXIMIN SCH MG: 550 TABLET ORAL at 11:05

## 2023-04-26 RX ADMIN — SACUBITRIL AND VALSARTAN SCH TAB: 24; 26 TABLET, FILM COATED ORAL at 21:17

## 2023-04-26 RX ADMIN — LACTULOSE SCH GM: 20 SOLUTION ORAL at 11:02

## 2023-04-26 RX ADMIN — RIFAXIMIN SCH MG: 550 TABLET ORAL at 21:15

## 2023-04-26 RX ADMIN — THIAMINE HYDROCHLORIDE SCH MG: 100 INJECTION, SOLUTION INTRAMUSCULAR; INTRAVENOUS at 21:15

## 2023-04-26 RX ADMIN — PANTOPRAZOLE SODIUM SCH MG: 40 TABLET, DELAYED RELEASE ORAL at 11:04

## 2023-04-26 RX ADMIN — FOLIC ACID SCH MG: 1 TABLET ORAL at 11:04

## 2023-04-26 RX ADMIN — SACUBITRIL AND VALSARTAN SCH TAB: 24; 26 TABLET, FILM COATED ORAL at 11:09

## 2023-04-26 RX ADMIN — LIDOCAINE SCH PATCH: 50 PATCH TOPICAL at 11:04

## 2023-04-27 LAB
ALBUMIN SERPL-MCNC: 2 G/DL (ref 3.4–5)
ALP SERPL-CCNC: 89 U/L (ref 45–117)
ALT SERPL-CCNC: 19 U/L (ref 13–61)
ANION GAP SERPL CALC-SCNC: 7 MMOL/L (ref 8–16)
APTT BLD: 34.9 SECONDS (ref 25.2–36.5)
AST SERPL-CCNC: 59 U/L (ref 15–37)
BILIRUB SERPL-MCNC: 4.6 MG/DL (ref 0.2–1)
BUN SERPL-MCNC: 19.8 MG/DL (ref 7–18)
CALCIUM SERPL-MCNC: 8.6 MG/DL (ref 8.5–10.1)
CHLORIDE SERPL-SCNC: 108 MMOL/L (ref 98–107)
CO2 SERPL-SCNC: 24 MMOL/L (ref 21–32)
CREAT SERPL-MCNC: 1.2 MG/DL (ref 0.55–1.3)
DEPRECATED RDW RBC AUTO: 15.6 % (ref 11.9–15.9)
GLUCOSE SERPL-MCNC: 89 MG/DL (ref 74–106)
HCT VFR BLD CALC: 41.7 % (ref 35.4–49)
HGB BLD-MCNC: 14.3 GM/DL (ref 11.7–16.9)
INR BLD: 2.24 (ref 0.83–1.09)
MAGNESIUM SERPL-MCNC: 1.8 MG/DL (ref 1.8–2.4)
MCH RBC QN AUTO: 35.3 PG (ref 25.7–33.7)
MCHC RBC AUTO-ENTMCNC: 34.4 G/DL (ref 32–35.9)
MCV RBC: 102.8 FL (ref 80–96)
PHOSPHATE SERPL-MCNC: 3 MG/DL (ref 2.5–4.9)
PLATELET # BLD AUTO: 29 10^3/UL (ref 134–434)
PMV BLD: 10.7 FL (ref 7.5–11.1)
POTASSIUM SERPLBLD-SCNC: 4.1 MMOL/L (ref 3.5–5.1)
PROT SERPL-MCNC: 7.3 G/DL (ref 6.4–8.2)
PT PNL PPP: 25.8 SEC (ref 9.7–13)
RBC # BLD AUTO: 4.06 M/MM3 (ref 4–5.6)
SODIUM SERPL-SCNC: 138 MMOL/L (ref 136–145)
WBC # BLD AUTO: 3.5 K/MM3 (ref 4–10)

## 2023-04-27 RX ADMIN — THIAMINE HYDROCHLORIDE SCH MG: 100 INJECTION, SOLUTION INTRAMUSCULAR; INTRAVENOUS at 14:42

## 2023-04-27 RX ADMIN — RIFAXIMIN SCH MG: 550 TABLET ORAL at 21:06

## 2023-04-27 RX ADMIN — LACTULOSE SCH GM: 20 SOLUTION ORAL at 21:06

## 2023-04-27 RX ADMIN — LACTULOSE SCH GM: 20 SOLUTION ORAL at 14:42

## 2023-04-27 RX ADMIN — RIFAXIMIN SCH MG: 550 TABLET ORAL at 09:51

## 2023-04-27 RX ADMIN — THIAMINE HYDROCHLORIDE SCH MG: 100 INJECTION, SOLUTION INTRAMUSCULAR; INTRAVENOUS at 05:22

## 2023-04-27 RX ADMIN — PANTOPRAZOLE SODIUM SCH MG: 40 TABLET, DELAYED RELEASE ORAL at 09:51

## 2023-04-27 RX ADMIN — SACUBITRIL AND VALSARTAN SCH TAB: 24; 26 TABLET, FILM COATED ORAL at 09:51

## 2023-04-27 RX ADMIN — THIAMINE HYDROCHLORIDE SCH MG: 100 INJECTION, SOLUTION INTRAMUSCULAR; INTRAVENOUS at 21:07

## 2023-04-27 RX ADMIN — Medication SCH EACH: at 21:07

## 2023-04-27 RX ADMIN — FOLIC ACID SCH MG: 1 TABLET ORAL at 09:51

## 2023-04-27 RX ADMIN — LIDOCAINE SCH PATCH: 50 PATCH TOPICAL at 09:51

## 2023-04-27 RX ADMIN — SACUBITRIL AND VALSARTAN SCH TAB: 24; 26 TABLET, FILM COATED ORAL at 21:07

## 2023-04-27 RX ADMIN — Medication SCH MG: at 12:31

## 2023-04-27 RX ADMIN — SPIRONOLACTONE SCH MG: 25 TABLET, FILM COATED ORAL at 09:51

## 2023-04-28 LAB
ALBUMIN SERPL-MCNC: 2.1 G/DL (ref 3.4–5)
ALP SERPL-CCNC: 105 U/L (ref 45–117)
ALT SERPL-CCNC: 21 U/L (ref 13–61)
ANION GAP SERPL CALC-SCNC: 4 MMOL/L (ref 8–16)
APPEARANCE UR: CLEAR
APTT BLD: 34.2 SECONDS (ref 25.2–36.5)
AST SERPL-CCNC: 56 U/L (ref 15–37)
BACTERIA # UR AUTO: 1 /UL (ref 0–1359)
BILIRUB SERPL-MCNC: 3.5 MG/DL (ref 0.2–1)
BILIRUB UR STRIP.AUTO-MCNC: (no result) MG/DL
BUN SERPL-MCNC: 17.5 MG/DL (ref 7–18)
CALCIUM SERPL-MCNC: 8.9 MG/DL (ref 8.5–10.1)
CASTS URNS QL MICRO: 0 /UL (ref 0–3.1)
CHLORIDE SERPL-SCNC: 106 MMOL/L (ref 98–107)
CO2 SERPL-SCNC: 24 MMOL/L (ref 21–32)
COLOR UR: (no result)
CREAT SERPL-MCNC: 1.2 MG/DL (ref 0.55–1.3)
DEPRECATED RDW RBC AUTO: 15.5 % (ref 11.9–15.9)
EPITH CASTS URNS QL MICRO: 6 /UL (ref 0–25.1)
GLUCOSE SERPL-MCNC: 126 MG/DL (ref 74–106)
HCT VFR BLD CALC: 43.2 % (ref 35.4–49)
HGB BLD-MCNC: 14.8 GM/DL (ref 11.7–16.9)
INR BLD: 2.06 (ref 0.83–1.09)
KETONES UR QL STRIP: NEGATIVE
LEUKOCYTE ESTERASE UR QL STRIP.AUTO: NEGATIVE
MAGNESIUM SERPL-MCNC: 1.8 MG/DL (ref 1.8–2.4)
MCH RBC QN AUTO: 35.1 PG (ref 25.7–33.7)
MCHC RBC AUTO-ENTMCNC: 34.3 G/DL (ref 32–35.9)
MCV RBC: 102.4 FL (ref 80–96)
NITRITE UR QL STRIP: NEGATIVE
PH UR: 5.5 [PH] (ref 5–8)
PHOSPHATE SERPL-MCNC: 2.6 MG/DL (ref 2.5–4.9)
PLATELET # BLD AUTO: 30 10^3/UL (ref 134–434)
PMV BLD: 9.8 FL (ref 7.5–11.1)
POTASSIUM SERPLBLD-SCNC: 4.5 MMOL/L (ref 3.5–5.1)
PROT SERPL-MCNC: 8 G/DL (ref 6.4–8.2)
PROT UR QL STRIP: NEGATIVE
PROT UR QL STRIP: NEGATIVE
PT PNL PPP: 23.7 SEC (ref 9.7–13)
RBC # BLD AUTO: 18 /UL (ref 0–23.9)
RBC # BLD AUTO: 4.21 M/MM3 (ref 4–5.6)
SODIUM SERPL-SCNC: 134 MMOL/L (ref 136–145)
SP GR UR: 1.02 (ref 1.01–1.03)
UROBILINOGEN UR STRIP-MCNC: 2 MG/DL (ref 0.2–1)
WBC # BLD AUTO: 5.6 K/MM3 (ref 4–10)
WBC # UR AUTO: 4 /UL (ref 0–25.8)

## 2023-04-28 RX ADMIN — RIFAXIMIN SCH MG: 550 TABLET ORAL at 21:13

## 2023-04-28 RX ADMIN — SPIRONOLACTONE SCH: 25 TABLET, FILM COATED ORAL at 10:53

## 2023-04-28 RX ADMIN — LACTULOSE SCH GM: 20 SOLUTION ORAL at 05:14

## 2023-04-28 RX ADMIN — THIAMINE HYDROCHLORIDE SCH MG: 100 INJECTION, SOLUTION INTRAMUSCULAR; INTRAVENOUS at 14:29

## 2023-04-28 RX ADMIN — SACUBITRIL AND VALSARTAN SCH TAB: 24; 26 TABLET, FILM COATED ORAL at 11:25

## 2023-04-28 RX ADMIN — PANTOPRAZOLE SODIUM SCH MG: 40 TABLET, DELAYED RELEASE ORAL at 10:19

## 2023-04-28 RX ADMIN — THIAMINE HYDROCHLORIDE SCH MG: 100 INJECTION, SOLUTION INTRAMUSCULAR; INTRAVENOUS at 05:14

## 2023-04-28 RX ADMIN — LACTULOSE SCH GM: 20 SOLUTION ORAL at 14:29

## 2023-04-28 RX ADMIN — Medication PRN MG: at 21:12

## 2023-04-28 RX ADMIN — LIDOCAINE SCH PATCH: 50 PATCH TOPICAL at 10:21

## 2023-04-28 RX ADMIN — Medication SCH MG: at 10:21

## 2023-04-28 RX ADMIN — THIAMINE HYDROCHLORIDE SCH MG: 100 INJECTION, SOLUTION INTRAMUSCULAR; INTRAVENOUS at 21:47

## 2023-04-28 RX ADMIN — FOLIC ACID SCH MG: 1 TABLET ORAL at 10:19

## 2023-04-28 RX ADMIN — LACTULOSE SCH GM: 20 SOLUTION ORAL at 21:12

## 2023-04-28 RX ADMIN — Medication SCH EACH: at 22:09

## 2023-04-28 RX ADMIN — SACUBITRIL AND VALSARTAN SCH TAB: 24; 26 TABLET, FILM COATED ORAL at 21:18

## 2023-04-28 RX ADMIN — RIFAXIMIN SCH MG: 550 TABLET ORAL at 10:19

## 2023-04-29 LAB
ALBUMIN SERPL-MCNC: 2.1 G/DL (ref 3.4–5)
ALP SERPL-CCNC: 101 U/L (ref 45–117)
ALT SERPL-CCNC: 21 U/L (ref 13–61)
ANION GAP SERPL CALC-SCNC: 3 MMOL/L (ref 8–16)
APTT BLD: 32.6 SECONDS (ref 25.2–36.5)
AST SERPL-CCNC: 53 U/L (ref 15–37)
BILIRUB SERPL-MCNC: 2.8 MG/DL (ref 0.2–1)
BUN SERPL-MCNC: 16.6 MG/DL (ref 7–18)
CALCIUM SERPL-MCNC: 9.1 MG/DL (ref 8.5–10.1)
CHLORIDE SERPL-SCNC: 102 MMOL/L (ref 98–107)
CO2 SERPL-SCNC: 26 MMOL/L (ref 21–32)
CREAT SERPL-MCNC: 1.1 MG/DL (ref 0.55–1.3)
DEPRECATED RDW RBC AUTO: 15.8 % (ref 11.9–15.9)
GLUCOSE SERPL-MCNC: 120 MG/DL (ref 74–106)
HCT VFR BLD CALC: 43.1 % (ref 35.4–49)
HGB BLD-MCNC: 14.9 GM/DL (ref 11.7–16.9)
INR BLD: 1.99 (ref 0.83–1.09)
MAGNESIUM SERPL-MCNC: 1.8 MG/DL (ref 1.8–2.4)
MCH RBC QN AUTO: 35.3 PG (ref 25.7–33.7)
MCHC RBC AUTO-ENTMCNC: 34.5 G/DL (ref 32–35.9)
MCV RBC: 102.5 FL (ref 80–96)
PHOSPHATE SERPL-MCNC: 2.6 MG/DL (ref 2.5–4.9)
PLATELET # BLD AUTO: 39 10^3/UL (ref 134–434)
PMV BLD: 10.1 FL (ref 7.5–11.1)
POTASSIUM SERPLBLD-SCNC: 4.4 MMOL/L (ref 3.5–5.1)
PROT SERPL-MCNC: 7.7 G/DL (ref 6.4–8.2)
PT PNL PPP: 22.9 SEC (ref 9.7–13)
RBC # BLD AUTO: 4.21 M/MM3 (ref 4–5.6)
SODIUM SERPL-SCNC: 131 MMOL/L (ref 136–145)
WBC # BLD AUTO: 5.4 K/MM3 (ref 4–10)

## 2023-04-29 RX ADMIN — RIFAXIMIN SCH MG: 550 TABLET ORAL at 21:40

## 2023-04-29 RX ADMIN — SPIRONOLACTONE SCH MG: 25 TABLET, FILM COATED ORAL at 09:27

## 2023-04-29 RX ADMIN — LACTULOSE SCH GM: 20 SOLUTION ORAL at 14:31

## 2023-04-29 RX ADMIN — LACTULOSE SCH GM: 20 SOLUTION ORAL at 21:40

## 2023-04-29 RX ADMIN — Medication SCH EACH: at 22:01

## 2023-04-29 RX ADMIN — Medication SCH MG: at 09:27

## 2023-04-29 RX ADMIN — RIFAXIMIN SCH MG: 550 TABLET ORAL at 09:27

## 2023-04-29 RX ADMIN — LIDOCAINE SCH PATCH: 50 PATCH TOPICAL at 09:27

## 2023-04-29 RX ADMIN — SACUBITRIL AND VALSARTAN SCH TAB: 24; 26 TABLET, FILM COATED ORAL at 09:27

## 2023-04-29 RX ADMIN — Medication PRN MG: at 21:40

## 2023-04-29 RX ADMIN — PANTOPRAZOLE SODIUM SCH MG: 40 TABLET, DELAYED RELEASE ORAL at 09:27

## 2023-04-29 RX ADMIN — FOLIC ACID SCH MG: 1 TABLET ORAL at 09:27

## 2023-04-29 RX ADMIN — THIAMINE HYDROCHLORIDE SCH MG: 100 INJECTION, SOLUTION INTRAMUSCULAR; INTRAVENOUS at 21:40

## 2023-04-29 RX ADMIN — SACUBITRIL AND VALSARTAN SCH TAB: 24; 26 TABLET, FILM COATED ORAL at 22:53

## 2023-04-29 RX ADMIN — LACTULOSE SCH GM: 20 SOLUTION ORAL at 06:09

## 2023-04-30 LAB
ALBUMIN SERPL-MCNC: 2.1 G/DL (ref 3.4–5)
ALP SERPL-CCNC: 103 U/L (ref 45–117)
ALT SERPL-CCNC: 25 U/L (ref 13–61)
ANION GAP SERPL CALC-SCNC: 6 MMOL/L (ref 8–16)
AST SERPL-CCNC: 56 U/L (ref 15–37)
BILIRUB SERPL-MCNC: 3.1 MG/DL (ref 0.2–1)
BUN SERPL-MCNC: 16.4 MG/DL (ref 7–18)
CALCIUM SERPL-MCNC: 9.3 MG/DL (ref 8.5–10.1)
CHLORIDE SERPL-SCNC: 103 MMOL/L (ref 98–107)
CO2 SERPL-SCNC: 23 MMOL/L (ref 21–32)
CREAT SERPL-MCNC: 1.1 MG/DL (ref 0.55–1.3)
DEPRECATED RDW RBC AUTO: 15.6 % (ref 11.9–15.9)
GLUCOSE SERPL-MCNC: 107 MG/DL (ref 74–106)
HCT VFR BLD CALC: 43.8 % (ref 35.4–49)
HGB BLD-MCNC: 15 GM/DL (ref 11.7–16.9)
INR BLD: 2.07 (ref 0.83–1.09)
MAGNESIUM SERPL-MCNC: 1.7 MG/DL (ref 1.8–2.4)
MCH RBC QN AUTO: 35.1 PG (ref 25.7–33.7)
MCHC RBC AUTO-ENTMCNC: 34.3 G/DL (ref 32–35.9)
MCV RBC: 102.3 FL (ref 80–96)
PHOSPHATE SERPL-MCNC: 2.6 MG/DL (ref 2.5–4.9)
PMV BLD: 9.7 FL (ref 7.5–11.1)
POTASSIUM SERPLBLD-SCNC: 4.2 MMOL/L (ref 3.5–5.1)
PROT SERPL-MCNC: 7.6 G/DL (ref 6.4–8.2)
PT PNL PPP: 23.8 SEC (ref 9.7–13)
RBC # BLD AUTO: 4.29 M/MM3 (ref 4–5.6)
SODIUM SERPL-SCNC: 132 MMOL/L (ref 136–145)
WBC # BLD AUTO: 4.8 K/MM3 (ref 4–10)
WBC NRBC COR # BLD: 5.5 K/MM3

## 2023-04-30 RX ADMIN — RIFAXIMIN SCH MG: 550 TABLET ORAL at 09:05

## 2023-04-30 RX ADMIN — Medication SCH MG: at 09:05

## 2023-04-30 RX ADMIN — RIFAXIMIN SCH MG: 550 TABLET ORAL at 22:59

## 2023-04-30 RX ADMIN — LIDOCAINE SCH PATCH: 50 PATCH TOPICAL at 09:05

## 2023-04-30 RX ADMIN — SPIRONOLACTONE SCH MG: 25 TABLET, FILM COATED ORAL at 09:05

## 2023-04-30 RX ADMIN — SACUBITRIL AND VALSARTAN SCH TAB: 24; 26 TABLET, FILM COATED ORAL at 23:12

## 2023-04-30 RX ADMIN — PANTOPRAZOLE SODIUM SCH MG: 40 TABLET, DELAYED RELEASE ORAL at 09:05

## 2023-04-30 RX ADMIN — Medication SCH EACH: at 23:11

## 2023-04-30 RX ADMIN — LACTULOSE SCH GM: 20 SOLUTION ORAL at 22:59

## 2023-04-30 RX ADMIN — FOLIC ACID SCH MG: 1 TABLET ORAL at 09:05

## 2023-04-30 RX ADMIN — SACUBITRIL AND VALSARTAN SCH TAB: 24; 26 TABLET, FILM COATED ORAL at 09:05

## 2023-04-30 RX ADMIN — LACTULOSE SCH GM: 20 SOLUTION ORAL at 05:59

## 2023-04-30 RX ADMIN — LACTULOSE SCH GM: 20 SOLUTION ORAL at 14:53

## 2023-04-30 RX ADMIN — THIAMINE HYDROCHLORIDE SCH: 100 INJECTION, SOLUTION INTRAMUSCULAR; INTRAVENOUS at 22:59

## 2023-05-01 LAB
ALBUMIN SERPL-MCNC: 2.2 G/DL (ref 3.4–5)
ALP SERPL-CCNC: 118 U/L (ref 45–117)
ALT SERPL-CCNC: 29 U/L (ref 13–61)
ANION GAP SERPL CALC-SCNC: 5 MMOL/L (ref 8–16)
AST SERPL-CCNC: 62 U/L (ref 15–37)
BASOPHILS # BLD: 0.4 % (ref 0–2)
BILIRUB CONJ SERPL-MCNC: 1.4 MG/DL (ref 0–0.2)
BILIRUB SERPL-MCNC: 3 MG/DL (ref 0.2–1)
BUN SERPL-MCNC: 18.5 MG/DL (ref 7–18)
CALCIUM SERPL-MCNC: 9.6 MG/DL (ref 8.5–10.1)
CHLORIDE SERPL-SCNC: 101 MMOL/L (ref 98–107)
CO2 SERPL-SCNC: 24 MMOL/L (ref 21–32)
CREAT SERPL-MCNC: 1.1 MG/DL (ref 0.55–1.3)
DEPRECATED RDW RBC AUTO: 15.6 % (ref 11.9–15.9)
EOSINOPHIL # BLD: 0.3 % (ref 0–4.5)
GLUCOSE SERPL-MCNC: 113 MG/DL (ref 74–106)
HCT VFR BLD CALC: 44.4 % (ref 35.4–49)
HGB BLD-MCNC: 14.8 GM/DL (ref 11.7–16.9)
INR BLD: 1.96 (ref 0.83–1.09)
LYMPHOCYTES # BLD: 17.7 % (ref 8–40)
MAGNESIUM SERPL-MCNC: 1.8 MG/DL (ref 1.8–2.4)
MCH RBC QN AUTO: 34.4 PG (ref 25.7–33.7)
MCHC RBC AUTO-ENTMCNC: 33.4 G/DL (ref 32–35.9)
MCV RBC: 103 FL (ref 80–96)
MONOCYTES # BLD AUTO: 15.9 % (ref 3.8–10.2)
NEUTROPHILS # BLD: 65.7 % (ref 42.8–82.8)
PHOSPHATE SERPL-MCNC: 2.8 MG/DL (ref 2.5–4.9)
PLATELET # BLD AUTO: 43 10^3/UL (ref 134–434)
PMV BLD: 9.8 FL (ref 7.5–11.1)
POTASSIUM SERPLBLD-SCNC: 4.7 MMOL/L (ref 3.5–5.1)
PROT SERPL-MCNC: 7.6 G/DL (ref 6.4–8.2)
PT PNL PPP: 22.6 SEC (ref 9.7–13)
RBC # BLD AUTO: 4.31 M/MM3 (ref 4–5.6)
SODIUM SERPL-SCNC: 130 MMOL/L (ref 136–145)
WBC # BLD AUTO: 6.7 K/MM3 (ref 4–10)

## 2023-05-01 RX ADMIN — LACTULOSE SCH: 20 SOLUTION ORAL at 06:29

## 2023-05-01 RX ADMIN — Medication PRN MG: at 21:40

## 2023-05-01 RX ADMIN — THIAMINE HYDROCHLORIDE SCH: 100 INJECTION, SOLUTION INTRAMUSCULAR; INTRAVENOUS at 20:53

## 2023-05-01 RX ADMIN — SACUBITRIL AND VALSARTAN SCH TAB: 24; 26 TABLET, FILM COATED ORAL at 21:40

## 2023-05-01 RX ADMIN — LACTULOSE SCH GM: 20 SOLUTION ORAL at 15:43

## 2023-05-01 RX ADMIN — RIFAXIMIN SCH MG: 550 TABLET ORAL at 21:40

## 2023-05-01 RX ADMIN — FOLIC ACID SCH MG: 1 TABLET ORAL at 09:59

## 2023-05-01 RX ADMIN — Medication SCH MG: at 10:00

## 2023-05-01 RX ADMIN — Medication SCH EACH: at 22:10

## 2023-05-01 RX ADMIN — SACUBITRIL AND VALSARTAN SCH TAB: 24; 26 TABLET, FILM COATED ORAL at 10:00

## 2023-05-01 RX ADMIN — RIFAXIMIN SCH MG: 550 TABLET ORAL at 10:00

## 2023-05-01 RX ADMIN — LIDOCAINE SCH PATCH: 50 PATCH TOPICAL at 10:00

## 2023-05-01 RX ADMIN — SPIRONOLACTONE SCH MG: 25 TABLET, FILM COATED ORAL at 10:00

## 2023-05-01 RX ADMIN — LACTULOSE SCH GM: 20 SOLUTION ORAL at 21:40

## 2023-05-01 RX ADMIN — PANTOPRAZOLE SODIUM SCH MG: 40 TABLET, DELAYED RELEASE ORAL at 09:59

## 2023-05-01 RX ADMIN — Medication PRN MG: at 01:05

## 2023-05-02 VITALS — RESPIRATION RATE: 18 BRPM

## 2023-05-02 LAB
ALBUMIN SERPL-MCNC: 2.3 G/DL (ref 3.4–5)
ALP SERPL-CCNC: 117 U/L (ref 45–117)
ALT SERPL-CCNC: 32 U/L (ref 13–61)
ANION GAP SERPL CALC-SCNC: 6 MMOL/L (ref 8–16)
AST SERPL-CCNC: 68 U/L (ref 15–37)
BASOPHILS # BLD: 0.2 % (ref 0–2)
BILIRUB CONJ SERPL-MCNC: 1.5 MG/DL (ref 0–0.2)
BILIRUB SERPL-MCNC: 3.3 MG/DL (ref 0.2–1)
BUN SERPL-MCNC: 18.7 MG/DL (ref 7–18)
CALCIUM SERPL-MCNC: 9.8 MG/DL (ref 8.5–10.1)
CHLORIDE SERPL-SCNC: 102 MMOL/L (ref 98–107)
CO2 SERPL-SCNC: 24 MMOL/L (ref 21–32)
CREAT SERPL-MCNC: 1.1 MG/DL (ref 0.55–1.3)
DEPRECATED RDW RBC AUTO: 15.7 % (ref 11.9–15.9)
EOSINOPHIL # BLD: 0.2 % (ref 0–4.5)
GLUCOSE SERPL-MCNC: 91 MG/DL (ref 74–106)
HCT VFR BLD CALC: 46.6 % (ref 35.4–49)
HGB BLD-MCNC: 15.9 GM/DL (ref 11.7–16.9)
INR BLD: 1.94 (ref 0.83–1.09)
LYMPHOCYTES # BLD: 17.6 % (ref 8–40)
MCH RBC QN AUTO: 35.1 PG (ref 25.7–33.7)
MCHC RBC AUTO-ENTMCNC: 34.1 G/DL (ref 32–35.9)
MCV RBC: 102.8 FL (ref 80–96)
MONOCYTES # BLD AUTO: 16.1 % (ref 3.8–10.2)
NEUTROPHILS # BLD: 65.9 % (ref 42.8–82.8)
PLATELET # BLD AUTO: 45 10^3/UL (ref 134–434)
PMV BLD: 9.7 FL (ref 7.5–11.1)
POTASSIUM SERPLBLD-SCNC: 5 MMOL/L (ref 3.5–5.1)
PROT SERPL-MCNC: 7.9 G/DL (ref 6.4–8.2)
PT PNL PPP: 22.4 SEC (ref 9.7–13)
RBC # BLD AUTO: 4.53 M/MM3 (ref 4–5.6)
SODIUM SERPL-SCNC: 132 MMOL/L (ref 136–145)
WBC # BLD AUTO: 6.1 K/MM3 (ref 4–10)

## 2023-05-02 RX ADMIN — PANTOPRAZOLE SODIUM SCH MG: 40 TABLET, DELAYED RELEASE ORAL at 10:32

## 2023-05-02 RX ADMIN — SPIRONOLACTONE SCH MG: 25 TABLET, FILM COATED ORAL at 10:32

## 2023-05-02 RX ADMIN — FOLIC ACID SCH MG: 1 TABLET ORAL at 10:32

## 2023-05-02 RX ADMIN — SACUBITRIL AND VALSARTAN SCH TAB: 24; 26 TABLET, FILM COATED ORAL at 10:32

## 2023-05-02 RX ADMIN — Medication SCH EACH: at 22:01

## 2023-05-02 RX ADMIN — Medication SCH MG: at 10:35

## 2023-05-02 RX ADMIN — LACTULOSE SCH GM: 20 SOLUTION ORAL at 06:10

## 2023-05-02 RX ADMIN — Medication PRN MG: at 21:57

## 2023-05-02 RX ADMIN — RIFAXIMIN SCH MG: 550 TABLET ORAL at 21:58

## 2023-05-02 RX ADMIN — LACTULOSE SCH GM: 20 SOLUTION ORAL at 13:19

## 2023-05-02 RX ADMIN — SODIUM CHLORIDE SCH: 9 INJECTION, SOLUTION INTRAVENOUS at 13:18

## 2023-05-02 RX ADMIN — LACTULOSE SCH GM: 20 SOLUTION ORAL at 21:58

## 2023-05-02 RX ADMIN — SACUBITRIL AND VALSARTAN SCH TAB: 24; 26 TABLET, FILM COATED ORAL at 22:47

## 2023-05-02 RX ADMIN — LIDOCAINE SCH PATCH: 50 PATCH TOPICAL at 10:32

## 2023-05-02 RX ADMIN — RIFAXIMIN SCH MG: 550 TABLET ORAL at 10:32

## 2023-05-03 VITALS — HEART RATE: 74 BPM | TEMPERATURE: 94.7 F | DIASTOLIC BLOOD PRESSURE: 62 MMHG | SYSTOLIC BLOOD PRESSURE: 110 MMHG

## 2023-05-03 LAB
ALBUMIN SERPL-MCNC: 2.1 G/DL (ref 3.4–5)
ALP SERPL-CCNC: 113 U/L (ref 45–117)
ALT SERPL-CCNC: 30 U/L (ref 13–61)
ANION GAP SERPL CALC-SCNC: 5 MMOL/L (ref 8–16)
ANISOCYTOSIS BLD QL: (no result)
AST SERPL-CCNC: 53 U/L (ref 15–37)
BILIRUB SERPL-MCNC: 3 MG/DL (ref 0.2–1)
BUN SERPL-MCNC: 19.8 MG/DL (ref 7–18)
CALCIUM SERPL-MCNC: 8.9 MG/DL (ref 8.5–10.1)
CHLORIDE SERPL-SCNC: 99 MMOL/L (ref 98–107)
CO2 SERPL-SCNC: 26 MMOL/L (ref 21–32)
CREAT SERPL-MCNC: 1.2 MG/DL (ref 0.55–1.3)
DEPRECATED RDW RBC AUTO: 15.9 % (ref 11.9–15.9)
GLUCOSE SERPL-MCNC: 87 MG/DL (ref 74–106)
HCT VFR BLD CALC: 43.5 % (ref 35.4–49)
HGB BLD-MCNC: 14.5 GM/DL (ref 11.7–16.9)
INR BLD: 1.95 (ref 0.83–1.09)
MACROCYTES BLD QL: (no result)
MCH RBC QN AUTO: 34.4 PG (ref 25.7–33.7)
MCHC RBC AUTO-ENTMCNC: 33.4 G/DL (ref 32–35.9)
MCV RBC: 102.8 FL (ref 80–96)
PLATELET # BLD AUTO: 47 10^3/UL (ref 134–434)
PMV BLD: 9.2 FL (ref 7.5–11.1)
POTASSIUM SERPLBLD-SCNC: 4.2 MMOL/L (ref 3.5–5.1)
PROT SERPL-MCNC: 7.1 G/DL (ref 6.4–8.2)
PT PNL PPP: 22.5 SEC (ref 9.7–13)
RBC # BLD AUTO: 4.23 M/MM3 (ref 4–5.6)
SODIUM SERPL-SCNC: 129 MMOL/L (ref 136–145)
WBC # BLD AUTO: 5.8 K/MM3 (ref 4–10)

## 2023-05-03 RX ADMIN — FOLIC ACID SCH MG: 1 TABLET ORAL at 09:36

## 2023-05-03 RX ADMIN — PANTOPRAZOLE SODIUM SCH MG: 40 TABLET, DELAYED RELEASE ORAL at 09:36

## 2023-05-03 RX ADMIN — SODIUM CHLORIDE SCH MLS/HR: 9 INJECTION, SOLUTION INTRAVENOUS at 13:57

## 2023-05-03 RX ADMIN — LIDOCAINE SCH PATCH: 50 PATCH TOPICAL at 09:35

## 2023-05-03 RX ADMIN — SACUBITRIL AND VALSARTAN SCH TAB: 24; 26 TABLET, FILM COATED ORAL at 09:36

## 2023-05-03 RX ADMIN — LACTULOSE SCH GM: 20 SOLUTION ORAL at 13:53

## 2023-05-03 RX ADMIN — Medication SCH MG: at 09:36

## 2023-05-03 RX ADMIN — SPIRONOLACTONE SCH MG: 25 TABLET, FILM COATED ORAL at 09:36

## 2023-05-03 RX ADMIN — RIFAXIMIN SCH MG: 550 TABLET ORAL at 09:36

## 2023-05-03 RX ADMIN — LACTULOSE SCH GM: 20 SOLUTION ORAL at 05:25

## 2023-05-19 ENCOUNTER — HOSPITAL ENCOUNTER (INPATIENT)
Dept: HOSPITAL 74 - JER | Age: 58
LOS: 5 days | Discharge: HOME | DRG: 683 | End: 2023-05-24
Attending: STUDENT IN AN ORGANIZED HEALTH CARE EDUCATION/TRAINING PROGRAM | Admitting: INTERNAL MEDICINE
Payer: COMMERCIAL

## 2023-05-19 VITALS — BODY MASS INDEX: 30.9 KG/M2

## 2023-05-19 DIAGNOSIS — I24.8: ICD-10-CM

## 2023-05-19 DIAGNOSIS — R94.31: ICD-10-CM

## 2023-05-19 DIAGNOSIS — Z91.148: ICD-10-CM

## 2023-05-19 DIAGNOSIS — R33.9: ICD-10-CM

## 2023-05-19 DIAGNOSIS — E87.20: ICD-10-CM

## 2023-05-19 DIAGNOSIS — I11.0: ICD-10-CM

## 2023-05-19 DIAGNOSIS — K76.89: ICD-10-CM

## 2023-05-19 DIAGNOSIS — D64.9: ICD-10-CM

## 2023-05-19 DIAGNOSIS — I42.8: ICD-10-CM

## 2023-05-19 DIAGNOSIS — K59.00: ICD-10-CM

## 2023-05-19 DIAGNOSIS — K70.30: ICD-10-CM

## 2023-05-19 DIAGNOSIS — Z95.0: ICD-10-CM

## 2023-05-19 DIAGNOSIS — D69.6: ICD-10-CM

## 2023-05-19 DIAGNOSIS — I48.0: ICD-10-CM

## 2023-05-19 DIAGNOSIS — Z95.2: ICD-10-CM

## 2023-05-19 DIAGNOSIS — I48.91: ICD-10-CM

## 2023-05-19 DIAGNOSIS — I50.42: ICD-10-CM

## 2023-05-19 DIAGNOSIS — R26.81: ICD-10-CM

## 2023-05-19 DIAGNOSIS — K60.2: ICD-10-CM

## 2023-05-19 DIAGNOSIS — N17.9: Primary | ICD-10-CM

## 2023-05-19 LAB
ALBUMIN SERPL-MCNC: 1.9 G/DL (ref 3.4–5)
ALP SERPL-CCNC: 119 U/L (ref 45–117)
ALT SERPL-CCNC: 31 U/L (ref 13–61)
ANION GAP SERPL CALC-SCNC: 4 MMOL/L (ref 8–16)
APPEARANCE UR: CLEAR
APTT BLD: 32.7 SECONDS (ref 25.2–36.5)
AST SERPL-CCNC: 52 U/L (ref 15–37)
BACTERIA # UR AUTO: 12 /UL (ref 0–1359)
BASE EXCESS BLDV CALC-SCNC: -0.9 MMOL/L (ref -2–2)
BASOPHILS # BLD: 0.3 % (ref 0–2)
BILIRUB CONJ SERPL-MCNC: 2.2 MG/DL (ref 0–0.2)
BILIRUB SERPL-MCNC: 3.4 MG/DL (ref 0.2–1)
BILIRUB UR STRIP.AUTO-MCNC: (no result) MG/DL
BUN SERPL-MCNC: 33.5 MG/DL (ref 7–18)
CALCIUM SERPL-MCNC: 8.3 MG/DL (ref 8.5–10.1)
CASTS URNS QL MICRO: 4 /UL (ref 0–3.1)
CHLORIDE SERPL-SCNC: 110 MMOL/L (ref 98–107)
CO2 SERPL-SCNC: 25 MMOL/L (ref 21–32)
COLOR UR: (no result)
CREAT SERPL-MCNC: 1.7 MG/DL (ref 0.55–1.3)
DEPRECATED RDW RBC AUTO: 16 % (ref 11.9–15.9)
EOSINOPHIL # BLD: 0.4 % (ref 0–4.5)
EPITH CASTS URNS QL MICRO: 13 /UL (ref 0–25.1)
GLUCOSE SERPL-MCNC: 165 MG/DL (ref 74–106)
HCT VFR BLD CALC: 37.9 % (ref 35.4–49)
HCT VFR BLDV CALC: 38 % (ref 35.4–49)
HGB BLD-MCNC: 12.8 GM/DL (ref 11.7–16.9)
INR BLD: 1.52 (ref 0.83–1.09)
KETONES UR QL STRIP: NEGATIVE
LACTATE SERPL-MCNC: 2.2 MMOL/L (ref 0.4–2)
LEUKOCYTE ESTERASE UR QL STRIP.AUTO: (no result)
LYMPHOCYTES # BLD: 16 % (ref 8–40)
MCH RBC QN AUTO: 34.4 PG (ref 25.7–33.7)
MCHC RBC AUTO-ENTMCNC: 33.8 G/DL (ref 32–35.9)
MCV RBC: 102 FL (ref 80–96)
MONOCYTES # BLD AUTO: 13.2 % (ref 3.8–10.2)
NEUTROPHILS # BLD: 70.1 % (ref 42.8–82.8)
NITRITE UR QL STRIP: NEGATIVE
PCO2 BLDV: 48 MMHG (ref 38–52)
PH BLDV: 7.34 [PH] (ref 7.31–7.41)
PH UR: 5.5 [PH] (ref 5–8)
PLATELET # BLD AUTO: 23 10^3/UL (ref 134–434)
PMV BLD: 9.2 FL (ref 7.5–11.1)
POTASSIUM SERPLBLD-SCNC: 5.6 MMOL/L (ref 3.5–5.1)
PROT SERPL-MCNC: 6 G/DL (ref 6.4–8.2)
PROT UR QL STRIP: NEGATIVE
PROT UR QL STRIP: NEGATIVE
PT PNL PPP: 17.6 SEC (ref 9.7–13)
RBC # BLD AUTO: 21 /UL (ref 0–23.9)
RBC # BLD AUTO: 3.72 M/MM3 (ref 4–5.6)
SAO2 % BLDV: 17.9 % (ref 70–80)
SODIUM SERPL-SCNC: 138 MMOL/L (ref 136–145)
SP GR UR: 1.02 (ref 1.01–1.03)
UROBILINOGEN UR STRIP-MCNC: 1 MG/DL (ref 0.2–1)
WBC # BLD AUTO: 4.7 K/MM3 (ref 4–10)
WBC # UR AUTO: 17 /UL (ref 0–25.8)
YEAST BUDDING URNS QL: (no result)

## 2023-05-19 PROCEDURE — G0378 HOSPITAL OBSERVATION PER HR: HCPCS

## 2023-05-20 LAB
ALBUMIN SERPL-MCNC: 1.8 G/DL (ref 3.4–5)
ALP SERPL-CCNC: 96 U/L (ref 45–117)
ALT SERPL-CCNC: 28 U/L (ref 13–61)
ANION GAP SERPL CALC-SCNC: 7 MMOL/L (ref 8–16)
AST SERPL-CCNC: 56 U/L (ref 15–37)
BILIRUB SERPL-MCNC: 3 MG/DL (ref 0.2–1)
BUN SERPL-MCNC: 28.5 MG/DL (ref 7–18)
CALCIUM SERPL-MCNC: 7.4 MG/DL (ref 8.5–10.1)
CHLORIDE SERPL-SCNC: 111 MMOL/L (ref 98–107)
CO2 SERPL-SCNC: 22 MMOL/L (ref 21–32)
CREAT SERPL-MCNC: 1.4 MG/DL (ref 0.55–1.3)
DEPRECATED RDW RBC AUTO: 15.7 % (ref 11.9–15.9)
GLUCOSE SERPL-MCNC: 108 MG/DL (ref 74–106)
HCT VFR BLD CALC: 33.1 % (ref 35.4–49)
HGB BLD-MCNC: 11.6 GM/DL (ref 11.7–16.9)
INR BLD: 1.49 (ref 0.83–1.09)
MAGNESIUM SERPL-MCNC: 1.4 MG/DL (ref 1.8–2.4)
MCH RBC QN AUTO: 35.3 PG (ref 25.7–33.7)
MCHC RBC AUTO-ENTMCNC: 35 G/DL (ref 32–35.9)
MCV RBC: 100.9 FL (ref 80–96)
PHOSPHATE SERPL-MCNC: 3.2 MG/DL (ref 2.5–4.9)
PLATELET # BLD AUTO: 24 10^3/UL (ref 134–434)
PMV BLD: 10.6 FL (ref 7.5–11.1)
POTASSIUM SERPLBLD-SCNC: 4.9 MMOL/L (ref 3.5–5.1)
PROT SERPL-MCNC: 5.5 G/DL (ref 6.4–8.2)
PT PNL PPP: 17.2 SEC (ref 9.7–13)
RBC # BLD AUTO: 3.28 M/MM3 (ref 4–5.6)
SODIUM SERPL-SCNC: 140 MMOL/L (ref 136–145)
WBC # BLD AUTO: 6.1 K/MM3 (ref 4–10)

## 2023-05-20 RX ADMIN — RIFAXIMIN SCH: 550 TABLET ORAL at 22:54

## 2023-05-20 RX ADMIN — LACTULOSE SCH GM: 20 SOLUTION ORAL at 06:29

## 2023-05-20 RX ADMIN — RIFAXIMIN SCH MG: 550 TABLET ORAL at 23:17

## 2023-05-20 RX ADMIN — MULTIVITAMIN TABLET SCH TAB: TABLET at 10:26

## 2023-05-20 RX ADMIN — LACTULOSE SCH GM: 20 SOLUTION ORAL at 01:12

## 2023-05-20 RX ADMIN — FOLIC ACID SCH MG: 1 TABLET ORAL at 10:25

## 2023-05-20 RX ADMIN — Medication SCH MG: at 10:25

## 2023-05-20 RX ADMIN — PANTOPRAZOLE SODIUM SCH MG: 40 TABLET, DELAYED RELEASE ORAL at 10:26

## 2023-05-20 RX ADMIN — RIFAXIMIN SCH MG: 550 TABLET ORAL at 10:27

## 2023-05-21 LAB
ALBUMIN SERPL-MCNC: 1.7 G/DL (ref 3.4–5)
ALP SERPL-CCNC: 99 U/L (ref 45–117)
ALT SERPL-CCNC: 25 U/L (ref 13–61)
ANION GAP SERPL CALC-SCNC: 5 MMOL/L (ref 8–16)
AST SERPL-CCNC: 48 U/L (ref 15–37)
BASOPHILS # BLD: 0.4 % (ref 0–2)
BILIRUB SERPL-MCNC: 2.3 MG/DL (ref 0.2–1)
BUN SERPL-MCNC: 27.7 MG/DL (ref 7–18)
CALCIUM SERPL-MCNC: 7.7 MG/DL (ref 8.5–10.1)
CHLORIDE SERPL-SCNC: 111 MMOL/L (ref 98–107)
CO2 SERPL-SCNC: 21 MMOL/L (ref 21–32)
CREAT SERPL-MCNC: 1.3 MG/DL (ref 0.55–1.3)
DEPRECATED RDW RBC AUTO: 15.1 % (ref 11.9–15.9)
EOSINOPHIL # BLD: 1.4 % (ref 0–4.5)
GLUCOSE SERPL-MCNC: 91 MG/DL (ref 74–106)
HCT VFR BLD CALC: 30.9 % (ref 35.4–49)
HGB BLD-MCNC: 10.9 GM/DL (ref 11.7–16.9)
INR BLD: 1.5 (ref 0.83–1.09)
LYMPHOCYTES # BLD: 22 % (ref 8–40)
MAGNESIUM SERPL-MCNC: 1.8 MG/DL (ref 1.8–2.4)
MCH RBC QN AUTO: 35.5 PG (ref 25.7–33.7)
MCHC RBC AUTO-ENTMCNC: 35.5 G/DL (ref 32–35.9)
MCV RBC: 100 FL (ref 80–96)
MONOCYTES # BLD AUTO: 16.3 % (ref 3.8–10.2)
NEUTROPHILS # BLD: 59.9 % (ref 42.8–82.8)
PHOSPHATE SERPL-MCNC: 3 MG/DL (ref 2.5–4.9)
PLATELET # BLD AUTO: 22 10^3/UL (ref 134–434)
PMV BLD: 9.8 FL (ref 7.5–11.1)
POTASSIUM SERPLBLD-SCNC: 4.7 MMOL/L (ref 3.5–5.1)
PROT SERPL-MCNC: 5.3 G/DL (ref 6.4–8.2)
PT PNL PPP: 17.3 SEC (ref 9.7–13)
RBC # BLD AUTO: 3.09 M/MM3 (ref 4–5.6)
SODIUM SERPL-SCNC: 136 MMOL/L (ref 136–145)
WBC # BLD AUTO: 3.7 K/MM3 (ref 4–10)

## 2023-05-21 RX ADMIN — SACUBITRIL AND VALSARTAN SCH: 24; 26 TABLET, FILM COATED ORAL at 21:19

## 2023-05-21 RX ADMIN — MULTIVITAMIN TABLET SCH TAB: TABLET at 09:11

## 2023-05-21 RX ADMIN — RIFAXIMIN SCH MG: 550 TABLET ORAL at 21:20

## 2023-05-21 RX ADMIN — PANTOPRAZOLE SODIUM SCH MG: 40 TABLET, DELAYED RELEASE ORAL at 09:11

## 2023-05-21 RX ADMIN — Medication SCH MG: at 09:11

## 2023-05-21 RX ADMIN — SACUBITRIL AND VALSARTAN SCH TAB: 24; 26 TABLET, FILM COATED ORAL at 09:49

## 2023-05-21 RX ADMIN — FOLIC ACID SCH MG: 1 TABLET ORAL at 09:16

## 2023-05-21 RX ADMIN — RIFAXIMIN SCH MG: 550 TABLET ORAL at 09:29

## 2023-05-21 RX ADMIN — PREDNISOLONE SODIUM PHOSPHATE SCH ML: 15 SOLUTION ORAL at 09:17

## 2023-05-21 RX ADMIN — SPIRONOLACTONE SCH MG: 25 TABLET, FILM COATED ORAL at 09:49

## 2023-05-22 LAB
ALBUMIN SERPL-MCNC: 2 G/DL (ref 3.4–5)
ALP SERPL-CCNC: 120 U/L (ref 45–117)
ALT SERPL-CCNC: 31 U/L (ref 13–61)
ANION GAP SERPL CALC-SCNC: 4 MMOL/L (ref 8–16)
APTT BLD: 31 SECONDS (ref 25.2–36.5)
AST SERPL-CCNC: 58 U/L (ref 15–37)
BILIRUB CONJ SERPL-MCNC: 1.5 MG/DL (ref 0–0.2)
BILIRUB SERPL-MCNC: 2.5 MG/DL (ref 0.2–1)
BUN SERPL-MCNC: 23.9 MG/DL (ref 7–18)
CALCIUM SERPL-MCNC: 8 MG/DL (ref 8.5–10.1)
CHLORIDE SERPL-SCNC: 106 MMOL/L (ref 98–107)
CO2 SERPL-SCNC: 25 MMOL/L (ref 21–32)
CREAT SERPL-MCNC: 1.2 MG/DL (ref 0.55–1.3)
DEPRECATED RDW RBC AUTO: 15.8 % (ref 11.9–15.9)
GLUCOSE SERPL-MCNC: 91 MG/DL (ref 74–106)
HCT VFR BLD CALC: 34.5 % (ref 35.4–49)
HGB BLD-MCNC: 11.6 GM/DL (ref 11.7–16.9)
INR BLD: 1.44 (ref 0.83–1.09)
MAGNESIUM SERPL-MCNC: 1.8 MG/DL (ref 1.8–2.4)
MCH RBC QN AUTO: 34.1 PG (ref 25.7–33.7)
MCHC RBC AUTO-ENTMCNC: 33.6 G/DL (ref 32–35.9)
MCV RBC: 101.6 FL (ref 80–96)
PHOSPHATE SERPL-MCNC: 2.5 MG/DL (ref 2.5–4.9)
PLATELET # BLD AUTO: 30 10^3/UL (ref 134–434)
PMV BLD: 9.9 FL (ref 7.5–11.1)
POTASSIUM SERPLBLD-SCNC: 4.7 MMOL/L (ref 3.5–5.1)
PROT SERPL-MCNC: 6 G/DL (ref 6.4–8.2)
PT PNL PPP: 16.6 SEC (ref 9.7–13)
RBC # BLD AUTO: 3.4 M/MM3 (ref 4–5.6)
SODIUM SERPL-SCNC: 136 MMOL/L (ref 136–145)
WBC # BLD AUTO: 3.6 K/MM3 (ref 4–10)

## 2023-05-22 RX ADMIN — Medication SCH MG: at 10:27

## 2023-05-22 RX ADMIN — PANTOPRAZOLE SODIUM SCH MG: 40 TABLET, DELAYED RELEASE ORAL at 10:28

## 2023-05-22 RX ADMIN — PETROLATUM SCH: 42 OINTMENT TOPICAL at 05:58

## 2023-05-22 RX ADMIN — RIFAXIMIN SCH MG: 550 TABLET ORAL at 22:06

## 2023-05-22 RX ADMIN — RIFAXIMIN SCH MG: 550 TABLET ORAL at 10:26

## 2023-05-22 RX ADMIN — MULTIVITAMIN TABLET SCH TAB: TABLET at 10:28

## 2023-05-22 RX ADMIN — PETROLATUM SCH: 42 OINTMENT TOPICAL at 22:06

## 2023-05-22 RX ADMIN — SENNOSIDES SCH MG: 8.8 LIQUID ORAL at 22:38

## 2023-05-22 RX ADMIN — LACTULOSE SCH GM: 20 SOLUTION ORAL at 22:06

## 2023-05-22 RX ADMIN — SPIRONOLACTONE SCH MG: 25 TABLET, FILM COATED ORAL at 10:28

## 2023-05-22 RX ADMIN — FOLIC ACID SCH MG: 1 TABLET ORAL at 10:27

## 2023-05-22 RX ADMIN — PREDNISOLONE SODIUM PHOSPHATE SCH ML: 15 SOLUTION ORAL at 10:26

## 2023-05-22 RX ADMIN — SACUBITRIL AND VALSARTAN SCH: 24; 26 TABLET, FILM COATED ORAL at 22:08

## 2023-05-22 RX ADMIN — SACUBITRIL AND VALSARTAN SCH TAB: 24; 26 TABLET, FILM COATED ORAL at 10:27

## 2023-05-22 RX ADMIN — PETROLATUM SCH: 42 OINTMENT TOPICAL at 10:28

## 2023-05-23 VITALS — RESPIRATION RATE: 18 BRPM

## 2023-05-23 LAB
ALBUMIN SERPL-MCNC: 1.9 G/DL (ref 3.4–5)
ALP SERPL-CCNC: 94 U/L (ref 45–117)
ALT SERPL-CCNC: 29 U/L (ref 13–61)
ANION GAP SERPL CALC-SCNC: 10 MMOL/L (ref 8–16)
AST SERPL-CCNC: 63 U/L (ref 15–37)
BASOPHILS # BLD: 0.5 % (ref 0–2)
BILIRUB SERPL-MCNC: 2.6 MG/DL (ref 0.2–1)
BUN SERPL-MCNC: 17 MG/DL (ref 7–18)
CALCIUM SERPL-MCNC: 8.4 MG/DL (ref 8.5–10.1)
CHLORIDE SERPL-SCNC: 108 MMOL/L (ref 98–107)
CO2 SERPL-SCNC: 18 MMOL/L (ref 21–32)
CREAT SERPL-MCNC: 1.3 MG/DL (ref 0.55–1.3)
DEPRECATED RDW RBC AUTO: 15.3 % (ref 11.9–15.9)
EOSINOPHIL # BLD: 1.3 % (ref 0–4.5)
GLUCOSE SERPL-MCNC: 153 MG/DL (ref 74–106)
HCT VFR BLD CALC: 35.5 % (ref 35.4–49)
HGB BLD-MCNC: 12.4 GM/DL (ref 11.7–16.9)
INR BLD: 1.52 (ref 0.83–1.09)
LYMPHOCYTES # BLD: 38.2 % (ref 8–40)
MAGNESIUM SERPL-MCNC: 1.6 MG/DL (ref 1.8–2.4)
MCH RBC QN AUTO: 34.8 PG (ref 25.7–33.7)
MCHC RBC AUTO-ENTMCNC: 35 G/DL (ref 32–35.9)
MCV RBC: 99.6 FL (ref 80–96)
MONOCYTES # BLD AUTO: 14.7 % (ref 3.8–10.2)
NEUTROPHILS # BLD: 45.3 % (ref 42.8–82.8)
PHOSPHATE SERPL-MCNC: 2.6 MG/DL (ref 2.5–4.9)
PLATELET # BLD AUTO: 40 10^3/UL (ref 134–434)
PMV BLD: 9.6 FL (ref 7.5–11.1)
POTASSIUM SERPLBLD-SCNC: 4.3 MMOL/L (ref 3.5–5.1)
PROT SERPL-MCNC: 5.9 G/DL (ref 6.4–8.2)
PT PNL PPP: 17.6 SEC (ref 9.7–13)
RBC # BLD AUTO: 3.57 M/MM3 (ref 4–5.6)
SODIUM SERPL-SCNC: 135 MMOL/L (ref 136–145)
WBC # BLD AUTO: 4.3 K/MM3 (ref 4–10)

## 2023-05-23 RX ADMIN — SENNOSIDES SCH: 8.8 LIQUID ORAL at 21:43

## 2023-05-23 RX ADMIN — LACTULOSE SCH: 20 SOLUTION ORAL at 09:39

## 2023-05-23 RX ADMIN — SPIRONOLACTONE SCH MG: 25 TABLET, FILM COATED ORAL at 09:40

## 2023-05-23 RX ADMIN — SACUBITRIL AND VALSARTAN SCH: 24; 26 TABLET, FILM COATED ORAL at 21:42

## 2023-05-23 RX ADMIN — SACUBITRIL AND VALSARTAN SCH TAB: 24; 26 TABLET, FILM COATED ORAL at 10:02

## 2023-05-23 RX ADMIN — PANTOPRAZOLE SODIUM SCH MG: 40 TABLET, DELAYED RELEASE ORAL at 09:40

## 2023-05-23 RX ADMIN — PETROLATUM SCH APPLIC: 42 OINTMENT TOPICAL at 21:41

## 2023-05-23 RX ADMIN — RIFAXIMIN SCH MG: 550 TABLET ORAL at 21:41

## 2023-05-23 RX ADMIN — MULTIVITAMIN TABLET SCH TAB: TABLET at 09:40

## 2023-05-23 RX ADMIN — FOLIC ACID SCH MG: 1 TABLET ORAL at 09:40

## 2023-05-23 RX ADMIN — PETROLATUM SCH APPLIC: 42 OINTMENT TOPICAL at 09:43

## 2023-05-23 RX ADMIN — LACTULOSE SCH: 20 SOLUTION ORAL at 21:42

## 2023-05-23 RX ADMIN — Medication SCH MG: at 09:40

## 2023-05-23 RX ADMIN — PREDNISOLONE SODIUM PHOSPHATE SCH MG: 15 SOLUTION ORAL at 09:39

## 2023-05-23 RX ADMIN — RIFAXIMIN SCH MG: 550 TABLET ORAL at 09:40

## 2023-05-24 VITALS — SYSTOLIC BLOOD PRESSURE: 110 MMHG | HEART RATE: 85 BPM | DIASTOLIC BLOOD PRESSURE: 55 MMHG

## 2023-05-24 VITALS — TEMPERATURE: 98.6 F

## 2023-05-24 LAB
ALBUMIN SERPL-MCNC: 1.8 G/DL (ref 3.4–5)
ALP SERPL-CCNC: 94 U/L (ref 45–117)
ALT SERPL-CCNC: 29 U/L (ref 13–61)
ANION GAP SERPL CALC-SCNC: 5 MMOL/L (ref 8–16)
AST SERPL-CCNC: 59 U/L (ref 15–37)
BILIRUB SERPL-MCNC: 2.3 MG/DL (ref 0.2–1)
BUN SERPL-MCNC: 15.4 MG/DL (ref 7–18)
CALCIUM SERPL-MCNC: 8.1 MG/DL (ref 8.5–10.1)
CHLORIDE SERPL-SCNC: 110 MMOL/L (ref 98–107)
CO2 SERPL-SCNC: 24 MMOL/L (ref 21–32)
CREAT SERPL-MCNC: 1.2 MG/DL (ref 0.55–1.3)
GLUCOSE SERPL-MCNC: 91 MG/DL (ref 74–106)
MAGNESIUM SERPL-MCNC: 1.6 MG/DL (ref 1.8–2.4)
POTASSIUM SERPLBLD-SCNC: 4.2 MMOL/L (ref 3.5–5.1)
PROT SERPL-MCNC: 5.5 G/DL (ref 6.4–8.2)
SODIUM SERPL-SCNC: 138 MMOL/L (ref 136–145)

## 2023-05-24 RX ADMIN — RIFAXIMIN SCH MG: 550 TABLET ORAL at 09:49

## 2023-05-24 RX ADMIN — PANTOPRAZOLE SODIUM SCH MG: 40 TABLET, DELAYED RELEASE ORAL at 09:49

## 2023-05-24 RX ADMIN — Medication SCH MG: at 09:49

## 2023-05-24 RX ADMIN — SACUBITRIL AND VALSARTAN SCH: 24; 26 TABLET, FILM COATED ORAL at 09:46

## 2023-05-24 RX ADMIN — PREDNISOLONE SODIUM PHOSPHATE SCH MG: 15 SOLUTION ORAL at 09:50

## 2023-05-24 RX ADMIN — LACTULOSE SCH: 20 SOLUTION ORAL at 09:46

## 2023-05-24 RX ADMIN — MULTIVITAMIN TABLET SCH TAB: TABLET at 09:49

## 2023-05-24 RX ADMIN — SPIRONOLACTONE SCH: 25 TABLET, FILM COATED ORAL at 09:46

## 2023-05-24 RX ADMIN — PETROLATUM SCH APPLIC: 42 OINTMENT TOPICAL at 11:33

## 2023-05-24 RX ADMIN — FOLIC ACID SCH MG: 1 TABLET ORAL at 09:49

## 2024-01-24 NOTE — PN
----- Message from [place] sent at 1/24/2024  8:14 AM CST -----  Regarding: u/a results  Patient had a MCR wellness today but re/s bc of the weather, she did her blood work and a u/a, her family thinks she may have a uti if doc can check on that at send in antibiotics if needed.  Please let granddaughter Shelbi know, [phone]     Physical Exam: 


SUBJECTIVE: Patient seen and examined this AM. Tolerating PO. 1 BM overnight.


Denies any fevers, chills, chest pain, SOB, nausea, vomiting diarrhea, 

constipation.








OBJECTIVE:





 Vital Signs











 Period  Temp  Pulse  Resp  BP Sys/Lombardi  Pulse Ox


 


 Last 24 Hr  99 F-99.8 F    18-20  105-142/62-88  97











GENERAL: A&Ox3, NAD


HEAD: NCAT


EYES: PERRL, EOMI, Scleral Icterus


ENT: Moist mucous membranes. 


NECK: Supple


LUNGS: CTAB, no wheezes, no crackles


HEART: Regular rate and rhythm, S1, S2 without murmur


ABDOMEN: Obese, Soft, nontender, nondistended, + bowel sounds, no guarding, no 

rebound


EXTREMITIES: No edema


NEUROLOGICAL: Cranial nerves II through XII grossly intact. Normal speech. 

Tremors with out stretched upper extremities. CIWA 10


SKIN: Warm, dry








 Laboratory Results - last 24 hr











  10/19/19 10/20/19 10/20/19





  05:13 05:30 05:30


 


WBC   5.3 


 


RBC   3.98 L 


 


Hgb   13.4 


 


Hct   39.2 


 


MCV   98.3 H 


 


MCH   33.7 


 


MCHC   34.3 


 


RDW   14.5 


 


Plt Count   77 L D 


 


MPV   9.3 


 


Absolute Neuts (auto)   2.9 


 


Neutrophils %   54.5 


 


Lymphocytes %   21.7  D 


 


Monocytes %   22.4 H 


 


Eosinophils %   0.6  D 


 


Basophils %   0.8 


 


Nucleated RBC %   0 


 


PT with INR   


 


INR   


 


Sodium  137   135 L


 


Potassium  4.0   3.8


 


Chloride  105   103


 


Carbon Dioxide  25   23


 


Anion Gap  8   9


 


BUN  8.9   12.6


 


Creatinine  0.8   0.9


 


Est GFR (CKD-EPI)AfAm  118.20   112.62


 


Est GFR (CKD-EPI)NonAf  101.99   97.17


 


Random Glucose  99   96


 


Calcium  8.7   8.5


 


Magnesium    1.7 L


 


Total Bilirubin  2.0 H   2.4 H


 


Direct Bilirubin    1.1 H


 


AST  94 H   66 H


 


ALT  34   27


 


Alkaline Phosphatase  80   75


 


Creatine Kinase  592 H   185


 


Creatine Kinase Index  0.7   0.7


 


CK-MB (CK-2)  4.3 H   1.3


 


Total Protein  8.3 H   8.1


 


Albumin  2.9 L   2.8 L














  10/20/19





  05:30


 


WBC 


 


RBC 


 


Hgb 


 


Hct 


 


MCV 


 


MCH 


 


MCHC 


 


RDW 


 


Plt Count 


 


MPV 


 


Absolute Neuts (auto) 


 


Neutrophils % 


 


Lymphocytes % 


 


Monocytes % 


 


Eosinophils % 


 


Basophils % 


 


Nucleated RBC % 


 


PT with INR  16.60 H


 


INR  1.40 H


 


Sodium 


 


Potassium 


 


Chloride 


 


Carbon Dioxide 


 


Anion Gap 


 


BUN 


 


Creatinine 


 


Est GFR (CKD-EPI)AfAm 


 


Est GFR (CKD-EPI)NonAf 


 


Random Glucose 


 


Calcium 


 


Magnesium 


 


Total Bilirubin 


 


Direct Bilirubin 


 


AST 


 


ALT 


 


Alkaline Phosphatase 


 


Creatine Kinase 


 


Creatine Kinase Index 


 


CK-MB (CK-2) 


 


Total Protein 


 


Albumin 





 


 


 Microbiology





10/17/19 12:05   Urine - Urine Clean Catch   Urine Culture - Final


                            NO GROWTH OBTAINED








Active Medications





Lactulose (Cephulac (Oral Use))  20 gm PO TID [facility], [facility]1983


   Last Admin: 10/20/19 06:02 Dose:  20 gm


Lorazepam (Ativan -)  0.5 mg PO Q4H PRN


   PRN Reason: Symptoms of Withdrawal


   Stop: 10/20/19 23:59


Lorazepam (Ativan Injection -)  2 mg IVPUSH Q2H PRN


   PRN Reason: ANXIETY


   Last Admin: 10/19/19 22:29 Dose:  2 mg


Losartan Potassium (Cozaar -)  25 mg PO DAILY [facility]


   Last Admin: 10/20/19 09:22 Dose:  25 mg


Magnesium Oxide (Mag-Ox -)  400 mg PO BID [facility]


   Stop: 10/20/19 22:01


   Last Admin: 10/20/19 09:22 Dose:  400 mg


Nadolol (Corgard -)  40 mg PO DAILY [facility]


   Last Admin: 10/20/19 09:22 Dose:  40 mg


Spironolactone (Aldactone -)  25 mg PO BID [facility]


   Last Admin: 10/20/19 09:22 Dose:  25 mg











ASSESSMENT/PLAN:


55 y/o M with PMHx of HTN, HLD, bicuspid aortic valve, critical aortic stenosis 

s/p bioprosthetic SAVR, short IABP support and dual chamber pacemaker 

implantation for CHB 02/27/2018, who presented with visual hallucinations, 

found to be in acute EtOH withdrawal and started on Ativan protocol. 





#Acute EtOH withdrawal 


-In the setting of Deliriuim Tremens with possible hepatic encephalopathy


-CIWA 11 this AM, Improving


-Continue Ativan protocol


-Continue Thiamine/Folate/MVI supplementation 


-Neurology and GI Consulted, Appreciate rec's





#Cirrhosis 


-Due to EtOH use disorder


-Hyperbilirubinemia worsening


-Continue Lactulose, Spironolactone, Nadolol


-Hepatitis B Surface AB pending; If negative will give Hep B Vaccine


-Will Refer to a liver center as outpatient upon discharge





#Elevated Troponin I


-Trops peaked and plateu'ed


-Cardio consulted, appreciate rec's, 


-Echo pending


-Tele





#Thrombocytopenia


-Hold Chemical AC, ASA


-Trend Platelets





#HLD


-Atorvastatin held in the setting of transaminitis   





#HTN


-Continue Nadolol and Losartan





#FEN


-No standing fluids


-Replete Lytes PRN


-Na controlled diet





#PPx


-DVT: SCD's


-GI: PPI





Dispo: monitor on tele








Visit type





- Emergency Visit


Emergency Visit: Yes


ED Registration Date: 10/17/19


Care time: The patient presented to the Emergency Department on the above date 

and was hospitalized for further evaluation of their emergent condition.





- New Patient


This patient is new to me today: Yes


Date on this admission: 10/21/19





- Critical Care


Critical Care patient: No





ATTENDING PHYSICIAN STATEMENT





I saw and evaluated the patient.


I reviewed the resident's note and discussed the case with the resident.


I agree with the resident's findings and plan as documented.








SUBJECTIVE:








OBJECTIVE:








ASSESSMENT AND PLAN: